# Patient Record
Sex: MALE | Race: WHITE | NOT HISPANIC OR LATINO | URBAN - METROPOLITAN AREA
[De-identification: names, ages, dates, MRNs, and addresses within clinical notes are randomized per-mention and may not be internally consistent; named-entity substitution may affect disease eponyms.]

---

## 2017-03-06 ENCOUNTER — EMERGENCY (EMERGENCY)
Facility: HOSPITAL | Age: 67
LOS: 1 days | Discharge: HOME | End: 2017-03-06
Attending: EMERGENCY MEDICINE | Admitting: EMERGENCY MEDICINE
Payer: COMMERCIAL

## 2017-03-06 VITALS
SYSTOLIC BLOOD PRESSURE: 118 MMHG | RESPIRATION RATE: 18 BRPM | DIASTOLIC BLOOD PRESSURE: 64 MMHG | OXYGEN SATURATION: 99 % | HEART RATE: 60 BPM | TEMPERATURE: 99 F

## 2017-03-06 DIAGNOSIS — Z98.89 OTHER SPECIFIED POSTPROCEDURAL STATES: Chronic | ICD-10-CM

## 2017-03-06 PROCEDURE — 99283 EMERGENCY DEPT VISIT LOW MDM: CPT | Mod: GC

## 2017-03-06 PROCEDURE — 71020: CPT | Mod: 26

## 2017-03-06 RX ORDER — ACETAMINOPHEN 500 MG
650 TABLET ORAL ONCE
Qty: 0 | Refills: 0 | Status: COMPLETED | OUTPATIENT
Start: 2017-03-06 | End: 2017-03-06

## 2017-03-06 RX ADMIN — Medication 650 MILLIGRAM(S): at 21:38

## 2017-03-06 NOTE — ED PROVIDER NOTE - CARE PLAN
Principal Discharge DX:	Viral syndrome  Instructions for follow-up, activity and diet:	Follow up with your PMD within 48-72 hours. Clinic number: [phone].  Find a doctor: [phone].   Rest.  No sports or strenuous activities. Drink plenty of fluids. Take tylenol 650mg every 6 hours for pain or temp greater than 100.   May add ibuprofen 600 mg every 8 hours if you still have fevers or pain. take with food.  Return to the Emergency Department immediately for any worsening or continued fever, chills, weakness, nausea, vomiting, diarrhea, abdominal pain, headaches, neck stiffness, chest pain or shortness of breath. Principal Discharge DX:	Viral syndrome  Instructions for follow-up, activity and diet:	Follow up with your PMD within 48-72 hours. Clinic number: [phone].  Find a doctor: [phone].   Rest.  No sports or strenuous activities. Drink plenty of fluids. Take tylenol 650mg every 6 hours for pain or temp greater than 100.   May add ibuprofen 600 mg every 8 hours if you still have fevers or pain. take with food.  Return to the Emergency Department immediately for any worsening or continued fever, chills, weakness, nausea, vomiting, diarrhea, abdominal pain, headaches, neck stiffness, chest pain or shortness of breath. Principal Discharge DX:	Viral syndrome  Instructions for follow-up, activity and diet:	Follow up with your PMD within 48-72 hours. Clinic number: [phone].  Find a doctor: [phone].   Rest.  No sports or strenuous activities. Drink plenty of fluids. Take tylenol 650mg every 6 hours for pain or temp greater than 100.   May add ibuprofen 600 mg every 8 hours if you still have fevers or pain. take with food.  Return to the Emergency Department immediately for any worsening or continued fever, chills, weakness, nausea, vomiting, diarrhea, abdominal pain, headaches, neck stiffness, chest pain or shortness of breath.

## 2017-03-06 NOTE — ED PROVIDER NOTE - OBJECTIVE STATEMENT
65 y/o 67 y/o male here with c/o prod cough, muscle aches, fever x3 days.  Per pt, he developed these sx gradually and does not feel weak, but wanted to make sure he did not have pneumonia.

## 2017-03-06 NOTE — ED PROVIDER NOTE - PMH
Anxiety disorder    BPH (benign prostatic hypertrophy)    Cecal volvulus    Panic disorder    Sleep apnea, unspecified type

## 2017-03-06 NOTE — ED PROVIDER NOTE - PLAN OF CARE
Follow up with your PMD within 48-72 hours. Clinic number: [phone].  Find a doctor: [phone].   Rest.  No sports or strenuous activities. Drink plenty of fluids. Take tylenol 650mg every 6 hours for pain or temp greater than 100.   May add ibuprofen 600 mg every 8 hours if you still have fevers or pain. take with food.  Return to the Emergency Department immediately for any worsening or continued fever, chills, weakness, nausea, vomiting, diarrhea, abdominal pain, headaches, neck stiffness, chest pain or shortness of breath.

## 2017-03-06 NOTE — ED ADULT NURSE NOTE - OBJECTIVE STATEMENT
pt received spot intake 13, alert and orientedx3. pt c.o cough, fever, body aches x3 days. no cough noted at this time. denies chest pain nausea dizziness. repsairtions even unlabored. medicated per mar. pt to be brought to results waiting for pain reassessment

## 2017-03-06 NOTE — ED PROVIDER NOTE - MEDICAL DECISION MAKING DETAILS
67 y/o male cough, fever, muscle aches x3 days with clear lung exam.  Sx c/w viral syndrome vs flu vs pneumonia.  CXR to eval for consolidation.  Otherwise well appearing with stable vs.  If no consolidation, d/c home, advise acetaminophen, fluids, close pcp f/u, to return if worse.

## 2017-03-28 ENCOUNTER — TRANSCRIPTION ENCOUNTER (OUTPATIENT)
Age: 67
End: 2017-03-28

## 2017-03-28 ENCOUNTER — EMERGENCY (EMERGENCY)
Facility: HOSPITAL | Age: 67
LOS: 1 days | Discharge: HOME | End: 2017-03-28
Attending: EMERGENCY MEDICINE | Admitting: EMERGENCY MEDICINE
Payer: COMMERCIAL

## 2017-03-28 VITALS — OXYGEN SATURATION: 100 % | TEMPERATURE: 99 F | HEART RATE: 72 BPM

## 2017-03-28 DIAGNOSIS — Z98.89 OTHER SPECIFIED POSTPROCEDURAL STATES: Chronic | ICD-10-CM

## 2017-03-28 PROCEDURE — 99284 EMERGENCY DEPT VISIT MOD MDM: CPT

## 2017-03-28 PROCEDURE — 93971 EXTREMITY STUDY: CPT | Mod: 26,LT

## 2017-03-28 NOTE — ED PROVIDER NOTE - CONSTITUTIONAL, MLM
normal... Well appearing, well nourished, awake, alert, oriented to person, place, time/situation and in no apparent distress. Vital signs stable.

## 2017-03-28 NOTE — ED PROVIDER NOTE - OBJECTIVE STATEMENT
65 y/o male, with PMHx of varicose vein to left leg presents to the ED c/o left leg swelling and change in color x 1 day with increase in pain from baseline. Pt reports visiting the urgent care and referring the pt to the ED. Also notes recent sickness, 3 days ago. Denies numbness, tingling, weakness, fever, chills, CP, SOB, abd pain, and any other complaints.

## 2017-03-28 NOTE — ED PROVIDER NOTE - CARE PLAN
Principal Discharge DX:	Hematoma  Instructions for follow-up, activity and diet:	Follow up with your primay care provider in 24-48 hours. they may recommend a repeat ultrasound in 1 week  [facility] a doctor at [phone] to find an provider who takes your insurance   Any worsening of symptoms or new concerning symptoms return to the ED Principal Discharge DX:	Hematoma  Instructions for follow-up, activity and diet:	Follow up with your primay care provider in 24-48 hours. they may recommend a repeat ultrasound in 1 week  [facility] at [phone] to find an provider who takes your insurance   Any worsening of symptoms or new concerning symptoms return to the ED

## 2017-03-28 NOTE — ED PROVIDER NOTE - MEDICAL DECISION MAKING DETAILS
Will get US r/o DVT. Per exam, brisk cap refill and pounding DP pulses. Not consistent with arterial insufficiency. Likely blood from disrupted varicosity.

## 2017-03-28 NOTE — ED ADULT TRIAGE NOTE - CHIEF COMPLAINT QUOTE
Pt with c/o of left leg pain for one day pt was seen in urgent care for Doppler. Pt noted with varicose veins and leg discoloration.Pt denies chest pain or sob

## 2017-03-28 NOTE — ED PROVIDER NOTE - NS ED MD SCRIBE ATTENDING SCRIBE SECTIONS
HIV/PAST MEDICAL/SURGICAL/SOCIAL HISTORY/DISPOSITION/HISTORY OF PRESENT ILLNESS/VITAL SIGNS( Pullset)/PHYSICAL EXAM/REVIEW OF SYSTEMS

## 2017-03-28 NOTE — ED PROVIDER NOTE - PLAN OF CARE
Follow up with your primay care provider in 24-48 hours. they may recommend a repeat ultrasound in 1 week  Call [facility] Find a doctor at [phone] to find an provider who takes your insurance   Any worsening of symptoms or new concerning symptoms return to the ED

## 2017-04-18 ENCOUNTER — EMERGENCY (EMERGENCY)
Facility: HOSPITAL | Age: 67
LOS: 1 days | Discharge: LEFT BEFORE DISCHARGE | End: 2017-04-18
Admitting: EMERGENCY MEDICINE

## 2017-04-18 VITALS
HEART RATE: 66 BPM | RESPIRATION RATE: 16 BRPM | TEMPERATURE: 97 F | OXYGEN SATURATION: 98 % | SYSTOLIC BLOOD PRESSURE: 124 MMHG | DIASTOLIC BLOOD PRESSURE: 80 MMHG

## 2017-04-18 DIAGNOSIS — Z98.89 OTHER SPECIFIED POSTPROCEDURAL STATES: Chronic | ICD-10-CM

## 2017-04-18 NOTE — ED ADULT TRIAGE NOTE - CHIEF COMPLAINT QUOTE
non radiating right sided chest pain, continuous, dull, increasing with movement  3/10  denies fever sob cough but states that three weeks ago he had flu with severe coughing and he suspects he might have ppulled a muscle

## 2017-04-20 ENCOUNTER — EMERGENCY (EMERGENCY)
Facility: HOSPITAL | Age: 67
LOS: 1 days | Discharge: HOME | End: 2017-04-20
Attending: EMERGENCY MEDICINE | Admitting: EMERGENCY MEDICINE
Payer: COMMERCIAL

## 2017-04-20 VITALS
TEMPERATURE: 99 F | DIASTOLIC BLOOD PRESSURE: 59 MMHG | HEART RATE: 52 BPM | RESPIRATION RATE: 20 BRPM | SYSTOLIC BLOOD PRESSURE: 105 MMHG | OXYGEN SATURATION: 97 %

## 2017-04-20 VITALS
SYSTOLIC BLOOD PRESSURE: 124 MMHG | RESPIRATION RATE: 16 BRPM | OXYGEN SATURATION: 99 % | HEART RATE: 42 BPM | DIASTOLIC BLOOD PRESSURE: 69 MMHG

## 2017-04-20 DIAGNOSIS — Z98.89 OTHER SPECIFIED POSTPROCEDURAL STATES: Chronic | ICD-10-CM

## 2017-04-20 PROCEDURE — 99285 EMERGENCY DEPT VISIT HI MDM: CPT | Mod: 25

## 2017-04-20 PROCEDURE — 93010 ELECTROCARDIOGRAM REPORT: CPT

## 2017-04-20 PROCEDURE — 71020: CPT | Mod: 26

## 2017-04-20 NOTE — ED PROVIDER NOTE - ATTENDING CONTRIBUTION TO CARE
Attending note: I have discussed with the resident their history, physical assessment, overall patient evaluation, and interview. I agree with the conclusions and proposed medical plan for the patient in the ED. I have personally interviewed, evaluated and examined the patient.

## 2017-04-20 NOTE — ED PROVIDER NOTE - CHPI ED SYMPTOMS NEG
no dizziness/no nausea/no vomiting/no fever/no numbness/no chills/no weakness/no tingling/no decreased eating/drinking

## 2017-04-20 NOTE — ED PROVIDER NOTE - PHYSICAL EXAMINATION
+mild b/l LE edema that pt states is chronic 2/2 varicose veins for which he follows with a vascular surgeon and PMD  +reproducible chest wall tenderness on R side of chest along intercostal muscles where pt reports pain +mild b/l LE edema that pt states is chronic 2/2 varicose veins for which he follows with a vascular surgeon and PMD  +reproducible chest wall tenderness on R side of chest along intercostal muscles where pt reports pain  Attending Note: 66 y/o male presents to the ED c/o non-radiating right sided c/p x weeks. PMH panic attacks. Not felling well x 3 weeks ago with flu. Improved but had significant coughing during his flu and first developed this pain during that time with coughing. Flu symptoms now resolved, x 4-5 days worsening non-radiating, nonexertional, non-pleuritic right sided c/p. No fever/chills, SOB, LAWRENCE, diaphoresis, syncope, trauma. Increases with movement. No: N/V/D, abdominal pain, trauma. Did not take any Rx for pain. No h/o cardiac disease.

## 2017-04-20 NOTE — ED PROVIDER NOTE - MEDICAL DECISION MAKING DETAILS
67M with chest discomfort x 2 weeks in setting of recent illness with coughing. Low suspcision for ACS given lack of SOB or exertional symptoms, clinical context of recent illness. Suspect musculoskeletal etiology. Will obtain EKG for evaluation for pericarditis (low suspicion), CXR to eval for PTX, reassess. Pt offered analgesia but declines.

## 2017-04-20 NOTE — ED PROVIDER NOTE - FAMILY HISTORY
Grandparent  Still living? Unknown  Family history of diabetes mellitus, Age at diagnosis: Age Unknown  Family history of cancer, Age at diagnosis: Age Unknown

## 2017-04-20 NOTE — ED ADULT NURSE NOTE - CHPI ED SYMPTOMS NEG
no back pain/no fever/no nausea/no syncope/no diaphoresis/no dizziness/no vomiting/no cough/no shortness of breath/no chills

## 2017-04-20 NOTE — ED ADULT NURSE NOTE - OBJECTIVE STATEMENT
Pt presents to the ED with c/o right anterior chest pain. Pt states that he had the flu 3 weeks ago and was "coughing a lot" during that time. Pt states that the chest pain began 3 weeks ago while he had the flu and was actively coughing. He then states that chest pain subsided and came back 4 days ago. Pt went to orthopedic walk in on tuesday, where they then referred him to the ED. Pt states that chest pain is present during movement of torso and occurs during palpation of right chest wall. Pt denies any chest pain during exercise. Pt presents with a low HR but states that this is baseline. Pt denies any SOB, fever, chills, abd pain, radiating pain. Pt has hx of panic and anxiety disorders. Pt pain is 1/10 when resting and 4/10 when moving. Pt refuses pain medication because he "does not believe in medications", and refuses lab work. Pt sinus bhavin on cardiac monitor. Will continue to monitor.

## 2017-04-20 NOTE — ED PROVIDER NOTE - OBJECTIVE STATEMENT
67M PMH panic attacks p/w several weeks of nagging right sided chest pain. Pain is nonradiating. Reports ill 3 weeks ago with flu, has since improved but had significant coughing during his flu and first developed this pain during that time a/w coughing. Flu symptoms now resolved but over past 4-5 days has had some worsening of right sided chest pain. No fever/chills. No SOB or LAWRENCE. Pain is nonradiating and nonexertional. Not pleuritic. Worse with movement. No NVD, abd pain. Did not take any medications for pain. No hx of cardiac disease, has good f/u.

## 2017-10-13 ENCOUNTER — APPOINTMENT (OUTPATIENT)
Dept: SPEECH THERAPY | Facility: CLINIC | Age: 67
End: 2017-10-13

## 2017-10-31 ENCOUNTER — OUTPATIENT (OUTPATIENT)
Dept: OUTPATIENT SERVICES | Facility: HOSPITAL | Age: 67
LOS: 1 days | Discharge: HOME | End: 2017-10-31

## 2017-10-31 ENCOUNTER — APPOINTMENT (OUTPATIENT)
Dept: SPEECH THERAPY | Facility: CLINIC | Age: 67
End: 2017-10-31

## 2017-10-31 DIAGNOSIS — Z98.89 OTHER SPECIFIED POSTPROCEDURAL STATES: Chronic | ICD-10-CM

## 2017-11-09 ENCOUNTER — APPOINTMENT (OUTPATIENT)
Dept: PHARMACY | Facility: CLINIC | Age: 67
End: 2017-11-09

## 2017-11-21 ENCOUNTER — APPOINTMENT (OUTPATIENT)
Dept: OTOLARYNGOLOGY | Facility: CLINIC | Age: 67
End: 2017-11-21

## 2017-12-06 DIAGNOSIS — H90.3 SENSORINEURAL HEARING LOSS, BILATERAL: ICD-10-CM

## 2017-12-08 ENCOUNTER — APPOINTMENT (OUTPATIENT)
Dept: OTOLARYNGOLOGY | Facility: CLINIC | Age: 67
End: 2017-12-08

## 2017-12-14 ENCOUNTER — APPOINTMENT (OUTPATIENT)
Dept: PHARMACY | Facility: CLINIC | Age: 67
End: 2017-12-14
Payer: SELF-PAY

## 2017-12-14 ENCOUNTER — APPOINTMENT (OUTPATIENT)
Dept: OTOLARYNGOLOGY | Facility: CLINIC | Age: 67
End: 2017-12-14
Payer: COMMERCIAL

## 2017-12-14 VITALS
BODY MASS INDEX: 28.44 KG/M2 | WEIGHT: 210 LBS | HEART RATE: 62 BPM | HEIGHT: 72 IN | SYSTOLIC BLOOD PRESSURE: 145 MMHG | DIASTOLIC BLOOD PRESSURE: 85 MMHG

## 2017-12-14 DIAGNOSIS — G47.33 OBSTRUCTIVE SLEEP APNEA (ADULT) (PEDIATRIC): ICD-10-CM

## 2017-12-14 DIAGNOSIS — H93.13 TINNITUS, BILATERAL: ICD-10-CM

## 2017-12-14 DIAGNOSIS — Z87.438 PERSONAL HISTORY OF OTHER DISEASES OF MALE GENITAL ORGANS: ICD-10-CM

## 2017-12-14 DIAGNOSIS — Z86.69 PERSONAL HISTORY OF OTHER DISEASES OF THE NERVOUS SYSTEM AND SENSE ORGANS: ICD-10-CM

## 2017-12-14 PROCEDURE — 99203 OFFICE O/P NEW LOW 30 MIN: CPT

## 2017-12-14 PROCEDURE — V5010 ASSESSMENT FOR HEARING AID: CPT | Mod: NC

## 2017-12-14 RX ORDER — MODAFINIL 100 MG/1
100 TABLET ORAL
Qty: 30 | Refills: 0 | Status: DISCONTINUED | COMMUNITY
Start: 2017-11-28

## 2017-12-18 ENCOUNTER — APPOINTMENT (OUTPATIENT)
Dept: PHARMACY | Facility: CLINIC | Age: 67
End: 2017-12-18

## 2017-12-29 ENCOUNTER — APPOINTMENT (OUTPATIENT)
Dept: OTOLARYNGOLOGY | Facility: CLINIC | Age: 67
End: 2017-12-29

## 2018-01-17 ENCOUNTER — EMERGENCY (EMERGENCY)
Facility: HOSPITAL | Age: 68
LOS: 1 days | Discharge: HOME | End: 2018-01-17
Attending: EMERGENCY MEDICINE | Admitting: EMERGENCY MEDICINE
Payer: COMMERCIAL

## 2018-01-17 VITALS
HEART RATE: 68 BPM | OXYGEN SATURATION: 100 % | DIASTOLIC BLOOD PRESSURE: 87 MMHG | TEMPERATURE: 98 F | RESPIRATION RATE: 16 BRPM | SYSTOLIC BLOOD PRESSURE: 141 MMHG

## 2018-01-17 DIAGNOSIS — Z98.89 OTHER SPECIFIED POSTPROCEDURAL STATES: Chronic | ICD-10-CM

## 2018-01-17 PROCEDURE — 73620 X-RAY EXAM OF FOOT: CPT | Mod: 26,LT

## 2018-01-17 PROCEDURE — 99283 EMERGENCY DEPT VISIT LOW MDM: CPT | Mod: 25

## 2018-01-17 RX ORDER — ACETAMINOPHEN 500 MG
650 TABLET ORAL ONCE
Qty: 0 | Refills: 0 | Status: COMPLETED | OUTPATIENT
Start: 2018-01-17 | End: 2018-01-17

## 2018-01-17 NOTE — ED ADULT TRIAGE NOTE - CHIEF COMPLAINT QUOTE
Left top of foot pain S/P dropping bottle on foot, pt foot noted to be swollen and red, as per patient "vascular issues", pt ambulatory without difficulty, just wants to make sure foot is "not broken"

## 2018-01-17 NOTE — ED PROVIDER NOTE - MEDICAL DECISION MAKING DETAILS
67M PMH PVD p/w L foot pain after dropping bottle of water on foot, no other systemic complaints. Vitals wnl, exam as above.  Clinically not infectious. Likely just muscle bruising. Low suspicion for fx.  Tylenol prn, xr, likely outpt pmd f/u.

## 2018-01-17 NOTE — ED PROVIDER NOTE - PHYSICAL EXAMINATION
no LE edema, normal equal distal pulses.   b/l LE hyperpigmentation, scattered erythema. non-pitting edema - per pt chronic and unchanged. no warmth. no skin breaks overlying area of pain. Minimal ttp L foot lateral/dorsal aspect. FROm.

## 2018-01-17 NOTE — ED PROVIDER NOTE - OBJECTIVE STATEMENT
67M PMH PVD p/w L foot pain. Dropped bottle of water on foot ~18 hrs ago, able to ambulate but pain wasn't improving so came to ED to make sure it wasn't broken. Hasn't taken anything for pain. Denies any other injuries. Denies f/c, weakness/numbness.

## 2018-01-18 ENCOUNTER — APPOINTMENT (OUTPATIENT)
Dept: PHARMACY | Facility: CLINIC | Age: 68
End: 2018-01-18

## 2018-01-22 ENCOUNTER — APPOINTMENT (OUTPATIENT)
Dept: PHARMACY | Facility: CLINIC | Age: 68
End: 2018-01-22

## 2018-01-30 ENCOUNTER — APPOINTMENT (OUTPATIENT)
Dept: PHARMACY | Facility: CLINIC | Age: 68
End: 2018-01-30

## 2018-01-30 ENCOUNTER — APPOINTMENT (OUTPATIENT)
Dept: OTOLARYNGOLOGY | Facility: CLINIC | Age: 68
End: 2018-01-30

## 2018-02-02 ENCOUNTER — APPOINTMENT (OUTPATIENT)
Dept: PHARMACY | Facility: CLINIC | Age: 68
End: 2018-02-02
Payer: SELF-PAY

## 2018-02-02 PROCEDURE — V5299A: CUSTOM | Mod: NC

## 2018-02-12 ENCOUNTER — APPOINTMENT (OUTPATIENT)
Dept: PHARMACY | Facility: CLINIC | Age: 68
End: 2018-02-12

## 2018-02-27 ENCOUNTER — APPOINTMENT (OUTPATIENT)
Dept: PHARMACY | Facility: CLINIC | Age: 68
End: 2018-02-27

## 2018-03-05 ENCOUNTER — APPOINTMENT (OUTPATIENT)
Dept: PHARMACY | Facility: CLINIC | Age: 68
End: 2018-03-05
Payer: SELF-PAY

## 2018-03-05 PROCEDURE — V5299A: CUSTOM | Mod: NC

## 2018-03-08 ENCOUNTER — APPOINTMENT (OUTPATIENT)
Dept: PHARMACY | Facility: CLINIC | Age: 68
End: 2018-03-08

## 2018-09-04 ENCOUNTER — EMERGENCY (EMERGENCY)
Facility: HOSPITAL | Age: 68
LOS: 1 days | Discharge: HOME | End: 2018-09-04
Attending: EMERGENCY MEDICINE | Admitting: EMERGENCY MEDICINE
Payer: COMMERCIAL

## 2018-09-04 VITALS
HEART RATE: 86 BPM | DIASTOLIC BLOOD PRESSURE: 66 MMHG | TEMPERATURE: 99 F | OXYGEN SATURATION: 96 % | SYSTOLIC BLOOD PRESSURE: 110 MMHG | RESPIRATION RATE: 16 BRPM

## 2018-09-04 DIAGNOSIS — Z98.89 OTHER SPECIFIED POSTPROCEDURAL STATES: Chronic | ICD-10-CM

## 2018-09-04 LAB
ALBUMIN SERPL ELPH-MCNC: 3.8 G/DL — SIGNIFICANT CHANGE UP (ref 3.3–5)
ALP SERPL-CCNC: 78 U/L — SIGNIFICANT CHANGE UP (ref 40–120)
ALT FLD-CCNC: 11 U/L — SIGNIFICANT CHANGE UP (ref 4–41)
APPEARANCE UR: SIGNIFICANT CHANGE UP
AST SERPL-CCNC: 16 U/L — SIGNIFICANT CHANGE UP (ref 4–40)
BACTERIA # UR AUTO: NEGATIVE — SIGNIFICANT CHANGE UP
BASE EXCESS BLDV CALC-SCNC: 1.2 MMOL/L — SIGNIFICANT CHANGE UP
BASOPHILS # BLD AUTO: 0.05 K/UL — SIGNIFICANT CHANGE UP (ref 0–0.2)
BASOPHILS NFR BLD AUTO: 0.7 % — SIGNIFICANT CHANGE UP (ref 0–2)
BILIRUB SERPL-MCNC: 0.8 MG/DL — SIGNIFICANT CHANGE UP (ref 0.2–1.2)
BILIRUB UR-MCNC: NEGATIVE — SIGNIFICANT CHANGE UP
BLOOD GAS VENOUS - CREATININE: 0.85 MG/DL — SIGNIFICANT CHANGE UP (ref 0.5–1.3)
BLOOD UR QL VISUAL: NEGATIVE — SIGNIFICANT CHANGE UP
BUN SERPL-MCNC: 18 MG/DL — SIGNIFICANT CHANGE UP (ref 7–23)
CALCIUM SERPL-MCNC: 8.3 MG/DL — LOW (ref 8.4–10.5)
CHLORIDE BLDV-SCNC: 108 MMOL/L — SIGNIFICANT CHANGE UP (ref 96–108)
CHLORIDE SERPL-SCNC: 105 MMOL/L — SIGNIFICANT CHANGE UP (ref 98–107)
CO2 SERPL-SCNC: 22 MMOL/L — SIGNIFICANT CHANGE UP (ref 22–31)
COLOR SPEC: YELLOW — SIGNIFICANT CHANGE UP
CREAT SERPL-MCNC: 0.88 MG/DL — SIGNIFICANT CHANGE UP (ref 0.5–1.3)
EOSINOPHIL # BLD AUTO: 0.12 K/UL — SIGNIFICANT CHANGE UP (ref 0–0.5)
EOSINOPHIL NFR BLD AUTO: 1.6 % — SIGNIFICANT CHANGE UP (ref 0–6)
GAS PNL BLDV: 136 MMOL/L — SIGNIFICANT CHANGE UP (ref 136–146)
GLUCOSE BLDV-MCNC: 97 — SIGNIFICANT CHANGE UP (ref 70–99)
GLUCOSE SERPL-MCNC: 96 MG/DL — SIGNIFICANT CHANGE UP (ref 70–99)
GLUCOSE UR-MCNC: NEGATIVE — SIGNIFICANT CHANGE UP
HCO3 BLDV-SCNC: 24 MMOL/L — SIGNIFICANT CHANGE UP (ref 20–27)
HCT VFR BLD CALC: 39.7 % — SIGNIFICANT CHANGE UP (ref 39–50)
HCT VFR BLDV CALC: 40.6 % — SIGNIFICANT CHANGE UP (ref 39–51)
HGB BLD-MCNC: 12.9 G/DL — LOW (ref 13–17)
HGB BLDV-MCNC: 13.2 G/DL — SIGNIFICANT CHANGE UP (ref 13–17)
IMM GRANULOCYTES # BLD AUTO: 0.02 # — SIGNIFICANT CHANGE UP
IMM GRANULOCYTES NFR BLD AUTO: 0.3 % — SIGNIFICANT CHANGE UP (ref 0–1.5)
KETONES UR-MCNC: SIGNIFICANT CHANGE UP
LACTATE BLDV-MCNC: 1.4 MMOL/L — SIGNIFICANT CHANGE UP (ref 0.5–2)
LEUKOCYTE ESTERASE UR-ACNC: NEGATIVE — SIGNIFICANT CHANGE UP
LYMPHOCYTES # BLD AUTO: 0.65 K/UL — LOW (ref 1–3.3)
LYMPHOCYTES # BLD AUTO: 8.5 % — LOW (ref 13–44)
MCHC RBC-ENTMCNC: 31.3 PG — SIGNIFICANT CHANGE UP (ref 27–34)
MCHC RBC-ENTMCNC: 32.5 % — SIGNIFICANT CHANGE UP (ref 32–36)
MCV RBC AUTO: 96.4 FL — SIGNIFICANT CHANGE UP (ref 80–100)
MONOCYTES # BLD AUTO: 0.77 K/UL — SIGNIFICANT CHANGE UP (ref 0–0.9)
MONOCYTES NFR BLD AUTO: 10.1 % — SIGNIFICANT CHANGE UP (ref 2–14)
NEUTROPHILS # BLD AUTO: 6.02 K/UL — SIGNIFICANT CHANGE UP (ref 1.8–7.4)
NEUTROPHILS NFR BLD AUTO: 78.8 % — HIGH (ref 43–77)
NITRITE UR-MCNC: NEGATIVE — SIGNIFICANT CHANGE UP
NRBC # FLD: 0 — SIGNIFICANT CHANGE UP
PCO2 BLDV: 46 MMHG — SIGNIFICANT CHANGE UP (ref 41–51)
PH BLDV: 7.37 PH — SIGNIFICANT CHANGE UP (ref 7.32–7.43)
PH UR: 6 — SIGNIFICANT CHANGE UP (ref 5–8)
PLATELET # BLD AUTO: 123 K/UL — LOW (ref 150–400)
PMV BLD: 12.2 FL — SIGNIFICANT CHANGE UP (ref 7–13)
PO2 BLDV: 32 MMHG — LOW (ref 35–40)
POTASSIUM BLDV-SCNC: 3.6 MMOL/L — SIGNIFICANT CHANGE UP (ref 3.4–4.5)
POTASSIUM SERPL-MCNC: 3.7 MMOL/L — SIGNIFICANT CHANGE UP (ref 3.5–5.3)
POTASSIUM SERPL-SCNC: 3.7 MMOL/L — SIGNIFICANT CHANGE UP (ref 3.5–5.3)
PROT SERPL-MCNC: 5.9 G/DL — LOW (ref 6–8.3)
PROT UR-MCNC: SIGNIFICANT CHANGE UP
RBC # BLD: 4.12 M/UL — LOW (ref 4.2–5.8)
RBC # FLD: 13.3 % — SIGNIFICANT CHANGE UP (ref 10.3–14.5)
RBC CASTS # UR COMP ASSIST: HIGH (ref 0–?)
SAO2 % BLDV: 62.1 % — SIGNIFICANT CHANGE UP (ref 60–85)
SODIUM SERPL-SCNC: 139 MMOL/L — SIGNIFICANT CHANGE UP (ref 135–145)
SP GR SPEC: 1.03 — SIGNIFICANT CHANGE UP (ref 1–1.04)
SQUAMOUS # UR AUTO: SIGNIFICANT CHANGE UP
URATE CRY FLD QL MICRO: SIGNIFICANT CHANGE UP (ref 0–0)
UROBILINOGEN FLD QL: SIGNIFICANT CHANGE UP
WBC # BLD: 7.63 K/UL — SIGNIFICANT CHANGE UP (ref 3.8–10.5)
WBC # FLD AUTO: 7.63 K/UL — SIGNIFICANT CHANGE UP (ref 3.8–10.5)
WBC UR QL: SIGNIFICANT CHANGE UP (ref 0–?)

## 2018-09-04 PROCEDURE — 71046 X-RAY EXAM CHEST 2 VIEWS: CPT | Mod: 26

## 2018-09-04 PROCEDURE — 99283 EMERGENCY DEPT VISIT LOW MDM: CPT

## 2018-09-04 RX ORDER — SODIUM CHLORIDE 9 MG/ML
1000 INJECTION INTRAMUSCULAR; INTRAVENOUS; SUBCUTANEOUS ONCE
Qty: 0 | Refills: 0 | Status: COMPLETED | OUTPATIENT
Start: 2018-09-04 | End: 2018-09-04

## 2018-09-04 RX ORDER — AZITHROMYCIN 500 MG/1
1 TABLET, FILM COATED ORAL
Qty: 3 | Refills: 0 | OUTPATIENT
Start: 2018-09-04 | End: 2018-09-06

## 2018-09-04 RX ADMIN — SODIUM CHLORIDE 1000 MILLILITER(S): 9 INJECTION INTRAMUSCULAR; INTRAVENOUS; SUBCUTANEOUS at 18:57

## 2018-09-04 NOTE — ED PROVIDER NOTE - MEDICAL DECISION MAKING DETAILS
plan to hydrate as pt appears dehyrated due to warm conditions, check labs, r/o pna, likely discharge. Pt leon snto want social support and states that he is working on getting AC from the [facility]

## 2018-09-04 NOTE — ED PROVIDER NOTE - OBJECTIVE STATEMENT
67 y/o M with h/o bph, cecal volvulus repair p/w diffuse boydaches for 1 day with cough and had green sputum today. Pt has been living in hot conditions and had no ac and owns an apartment. No recent travel or sick contact. Pt was working too much at his apartment in extreme heat and started feeling weak and tired 67 y/o M with h/o bph, cecal volvulus repair p/w diffuse bodyaches for 1 day with cough and had green sputum today. Pt has been living in hot conditions and had no ac and owns an apartment. No recent travel or sick contact. Pt was working too much at his apartment in extreme heat and started feeling weak and tired

## 2018-09-04 NOTE — ED PROVIDER NOTE - PROGRESS NOTE DETAILS
Dejon CHAVIS- pt reassured of no serious illness, Pt discharged home with advise to avoid extreme heat, stay hydrated

## 2018-09-04 NOTE — ED ADULT TRIAGE NOTE - CHIEF COMPLAINT QUOTE
Pt states that he has had subjective fever, sore throat, cough with yellow sputum since yesterday.  Pt states that he wants to find out if he's contagious. Has not taken any OTC medications for his symptoms PMH: TANGELA, BPH, volvulus, large bowel resection

## 2018-09-05 RX ORDER — AZITHROMYCIN 500 MG/1
1 TABLET, FILM COATED ORAL
Qty: 3 | Refills: 0 | OUTPATIENT
Start: 2018-09-05 | End: 2018-09-07

## 2018-09-05 NOTE — ED POST DISCHARGE NOTE - REASON FOR FOLLOW-UP
Other Patient requested we send Abx to different pharmacy. Sent over Azithromycin  to preferred pharmacy.

## 2018-12-07 ENCOUNTER — EMERGENCY (EMERGENCY)
Facility: HOSPITAL | Age: 68
LOS: 1 days | Discharge: HOME | End: 2018-12-07
Attending: EMERGENCY MEDICINE | Admitting: EMERGENCY MEDICINE
Payer: COMMERCIAL

## 2018-12-07 VITALS
OXYGEN SATURATION: 100 % | HEART RATE: 62 BPM | DIASTOLIC BLOOD PRESSURE: 84 MMHG | SYSTOLIC BLOOD PRESSURE: 161 MMHG | TEMPERATURE: 98 F | RESPIRATION RATE: 18 BRPM

## 2018-12-07 VITALS
RESPIRATION RATE: 16 BRPM | HEART RATE: 66 BPM | SYSTOLIC BLOOD PRESSURE: 160 MMHG | OXYGEN SATURATION: 100 % | DIASTOLIC BLOOD PRESSURE: 92 MMHG

## 2018-12-07 DIAGNOSIS — Z98.89 OTHER SPECIFIED POSTPROCEDURAL STATES: Chronic | ICD-10-CM

## 2018-12-07 PROCEDURE — 29125 APPL SHORT ARM SPLINT STATIC: CPT | Mod: RT

## 2018-12-07 PROCEDURE — 99283 EMERGENCY DEPT VISIT LOW MDM: CPT | Mod: 25

## 2018-12-07 PROCEDURE — 73110 X-RAY EXAM OF WRIST: CPT | Mod: 26,50

## 2018-12-07 PROCEDURE — 73130 X-RAY EXAM OF HAND: CPT | Mod: 26,50

## 2018-12-07 RX ORDER — ACETAMINOPHEN 500 MG
975 TABLET ORAL ONCE
Qty: 0 | Refills: 0 | Status: COMPLETED | OUTPATIENT
Start: 2018-12-07 | End: 2018-12-07

## 2018-12-07 NOTE — ED PROVIDER NOTE - ATTENDING CONTRIBUTION TO CARE
68M [facility] trip and fall outside.  Fell forward, hitting knees, R thumb, wrist, face.  Did not take anything for pain.  PMHX – TANGELA on CPAP, venous insufficiency.  No meds. R thumb ttp, R wrist mild ttp, no deformity.   Normal neuro exam.  Walked since the fall.  Mild nasal bone ttp, no deformity or epistaxis.  Plan xrays or R thumb, L wrist, R thumb spica; follow up with ENT for nasal abrasion and ortho for wrist/thumb injuries - rx pain med.  Td UTD.   VS:  unremarkable except htn    GEN - NAD; well appearing; A+O x3   HEAD - NC/AT     ENT - PEERL, EOMI, mucous membranes  moist , no discharge.  nasal abrasions.  No septal hematoma.  dried blood at nares, no active bleeding.  No facial ttp.     NECK: Neck supple, non-tender without lymphadenopathy, no masses, no JVD  PULM - CTA b/l,  symmetric breath sounds  COR -  normal heart sounds    ABD - , ND, NT, soft,  BACK - no CVA tenderness, nontender spine     EXTREMS - no edema, no deformity, warm and well perfused  R thumb mild ttp no deformity.  L wrist mild diffuse dorsal ttp no deformity.  distal N/V/T intact x 4.    SKIN - no rash or bruising    except as noted.   NEUROLOGIC - alert, CN 2-12 intact, sensation nl, motor no focal deficit.

## 2018-12-07 NOTE — ED PROVIDER NOTE - CARE PLAN
Principal Discharge DX:	Wrist sprain, right, initial encounter  Secondary Diagnosis:	Fall, initial encounter

## 2018-12-07 NOTE — ED ADULT NURSE REASSESSMENT NOTE - NS ED NURSE REASSESS COMMENT FT1
Pt a/o x 3. Pt discharged by Dr. briggs. Pt given written and verbal instructions by MD. Iv removed as per hospital protocol. Pt ambulated to exit

## 2018-12-07 NOTE — ED PROVIDER NOTE - ENMT, MLM
Airway patent, Nasal mucosa clear. No septal hematoma. Mild redness over nose and pain on palpation.

## 2018-12-07 NOTE — ED ADULT NURSE NOTE - CHIEF COMPLAINT QUOTE
Ambulatory s/p trip and fall on the street, patient has injury to nose, R thumb, abrasions to BL knees, and some to both hands. Patient also broke some teeth as a result of the fall, no oral bleeding. Patient denies LOC. Reports minimal gait instability, headache. No active bleeding at this time.

## 2018-12-07 NOTE — ED ADULT NURSE NOTE - NSIMPLEMENTINTERV_GEN_ALL_ED
Implemented All Fall Risk Interventions:  [place] to call system. Call bell, personal items and telephone within reach. Instruct patient to call for assistance. Room bathroom lighting operational. Non-slip footwear when patient is off stretcher. Physically safe environment: no spills, clutter or unnecessary equipment. Stretcher in lowest position, wheels locked, appropriate side rails in place. Provide visual cue, wrist band, yellow gown, etc. Monitor gait and stability. Monitor for mental status changes and reorient to person, place, and time. Review medications for side effects contributing to fall risk. Reinforce activity limits and safety measures with patient and family.

## 2018-12-07 NOTE — ED PROVIDER NOTE - MUSCULOSKELETAL, MLM
Spine appears normal, range of motion is not limited, no muscle or joint tenderness. Full ROM in knees and wrists BL. Mild pain to palpation of BL posterior hand and wrists.

## 2018-12-07 NOTE — ED PROVIDER NOTE - SKIN, MLM
Skin normal color for race, warm, dry and intact. Abrasions over knees BL. Redness over nose. Abrasions over R fingers.

## 2018-12-07 NOTE — ED PROVIDER NOTE - MEDICAL DECISION MAKING DETAILS
69 yo M presenting to [facility] ED s/p mechanical fall on sidewalk at night with BL hand and wrist pain, pain over nose, minimal knee pain. Denies other complaints. EOMI, no neuro deficits, no septal hematomas, no snuffbox tenderness. Will obtain hand and wrist xrays BL and treat pain. Reassess.

## 2018-12-07 NOTE — ED PROVIDER NOTE - NSFOLLOWUPINSTRUCTIONS_ED_ALL_ED_FT
Please keep your splint in place until you follow up with your primary doctor. Please keep the splint dry. Please follow up with a primary care provider as soon as possible. If you do not have a primary care doctor, you can make an appointment with one at the following location.   [facility] [place]  Phone: [phone]  Fax: [phone]  Address: [address] [place] [address], [facility] [address], [place] [address]    Additionally, you may use the following web address to find a [facility] physician close to you: [email or web address]     You may take 1000mg of Tylenol every 6 hours for baseline pain control with respect to the warnings on the label. You may take 600mg of ibuprofen (example: motrin or advil) every 6 hours for baseline pain control as indicated with respect to the warnings on the label. This is an over the counter medication. Please return to the emergency department if you experience worsening symptoms, or if you develop headache, neck stiffness, fever/chills, changes in vision, chest pain, shortness of breath, difficulty breathing, palpitations, lightheadedness, weakness, dizziness, numbness, tingling, abdominal pain, nausea, vomiting, diarrhea, changes in your urine, blood in the urine, painful urination, syncope (passing out), or for any other concerns.

## 2018-12-07 NOTE — ED PROVIDER NOTE - NEUROLOGICAL, MLM
Alert and oriented, no focal deficits, no motor or sensory deficits. 5/5 strength all extremities. Cranial nerves intact.

## 2018-12-07 NOTE — ED ADULT NURSE NOTE - OBJECTIVE STATEMENT
Pt ambulated to room 27 a/o x 3 s/p a fall. pt c/o left wrist, right thumb, nose pain. Pt states he was walking and tripped on a crack in the sidewalk. Pt denies any LOC and remembers what happened. Pt states he broke his fall with his hand but hit his nose on the floor. Pt denies being on any blood thinners. Pt noted to have abrasions to both hands and nose with no active bleeding noted. Respirations even and unlabored. Lung sounds clear with equal chest rise bilaterally. ABD is soft, non tender, non distended with normal active bowel sounds No complaints of chest pain, headache, nausea, dizziness, vomiting  SOB, fever, chills verbalized.

## 2018-12-07 NOTE — ED PROVIDER NOTE - OBJECTIVE STATEMENT
67 yo M w/ PMH of TANGELA on CPAP, anxiety, cecal volvulus s/p resection, presenting to [facility] ED d/t mechanical fall while walking from car to house on sidewalk, landing on knees and wrists BL, as well as face. Endorses pain over nose and posterior hands/wrists BL. Minimal pain over knees. Denies any lost teeth; endorses mild HA. Denies other complaints. Denies neck stiffness, fever/chills, vision change, chest pain, shortness of breath, difficulty breathing, palpitations, lightheadedness, weakness, dizziness, numbness, tingling, abdominal pain, nausea, vomiting, diarrhea, dysuria, hematuria, syncope.     PMD: Dr. Rachell Beard  Vascular Surgery: [facility]  Pharmacy: [place], [place]

## 2018-12-10 ENCOUNTER — APPOINTMENT (OUTPATIENT)
Dept: PHARMACY | Facility: CLINIC | Age: 68
End: 2018-12-10

## 2019-01-25 ENCOUNTER — APPOINTMENT (OUTPATIENT)
Dept: PHARMACY | Facility: CLINIC | Age: 69
End: 2019-01-25
Payer: SELF-PAY

## 2019-01-25 PROCEDURE — V5299A: CUSTOM | Mod: NC

## 2019-02-07 ENCOUNTER — APPOINTMENT (OUTPATIENT)
Dept: SPEECH THERAPY | Facility: CLINIC | Age: 69
End: 2019-02-07

## 2019-02-07 ENCOUNTER — APPOINTMENT (OUTPATIENT)
Dept: PHARMACY | Facility: CLINIC | Age: 69
End: 2019-02-07

## 2019-12-31 ENCOUNTER — APPOINTMENT (OUTPATIENT)
Dept: SPEECH THERAPY | Facility: CLINIC | Age: 69
End: 2019-12-31

## 2019-12-31 ENCOUNTER — OUTPATIENT (OUTPATIENT)
Dept: OUTPATIENT SERVICES | Facility: HOSPITAL | Age: 69
LOS: 1 days | Discharge: HOME | End: 2019-12-31

## 2019-12-31 DIAGNOSIS — Z98.89 OTHER SPECIFIED POSTPROCEDURAL STATES: Chronic | ICD-10-CM

## 2020-01-07 ENCOUNTER — APPOINTMENT (OUTPATIENT)
Dept: PHARMACY | Facility: CLINIC | Age: 70
End: 2020-01-07
Payer: SELF-PAY

## 2020-01-07 PROCEDURE — V5299A: CUSTOM | Mod: NC

## 2020-01-14 ENCOUNTER — APPOINTMENT (OUTPATIENT)
Dept: PHARMACY | Facility: CLINIC | Age: 70
End: 2020-01-14
Payer: SELF-PAY

## 2020-01-14 DIAGNOSIS — H90.3 SENSORINEURAL HEARING LOSS, BILATERAL: ICD-10-CM

## 2020-01-14 PROCEDURE — V5299A: CUSTOM | Mod: NC

## 2020-02-06 ENCOUNTER — APPOINTMENT (OUTPATIENT)
Dept: UROLOGY | Facility: CLINIC | Age: 70
End: 2020-02-06
Payer: COMMERCIAL

## 2020-02-06 DIAGNOSIS — K61.0 ANAL ABSCESS: ICD-10-CM

## 2020-02-06 DIAGNOSIS — Z12.5 ENCOUNTER FOR SCREENING FOR MALIGNANT NEOPLASM OF PROSTATE: ICD-10-CM

## 2020-02-06 PROBLEM — N50.819 TESTICULAR PAIN: Status: ACTIVE | Noted: 2020-02-06

## 2020-02-06 PROCEDURE — 99204 OFFICE O/P NEW MOD 45 MIN: CPT

## 2020-02-06 NOTE — PHYSICAL EXAM
[General Appearance - Well Developed] : well developed [Normal Appearance] : normal appearance [General Appearance - Well Nourished] : well nourished [Well Groomed] : well groomed [General Appearance - In No Acute Distress] : no acute distress [Edema] : no peripheral edema [Respiration, Rhythm And Depth] : normal respiratory rhythm and effort [Exaggerated Use Of Accessory Muscles For Inspiration] : no accessory muscle use [Abdomen Soft] : soft [Costovertebral Angle Tenderness] : no ~M costovertebral angle tenderness [Abdomen Tenderness] : non-tender [Urethral Meatus] : meatus normal [Urinary Bladder Findings] : the bladder was normal on palpation [Scrotum] : the scrotum was normal [Testes Mass (___cm)] : there were no testicular masses [No Prostate Nodules] : no prostate nodules [] : no rash [Normal Station and Gait] : the gait and station were normal for the patient's age [No Focal Deficits] : no focal deficits [Oriented To Time, Place, And Person] : oriented to person, place, and time [Mood] : the mood was normal [Not Anxious] : not anxious [Affect] : the affect was normal [No Palpable Adenopathy] : no palpable adenopathy

## 2020-02-09 LAB
APPEARANCE: CLEAR
BACTERIA UR CULT: NORMAL
BACTERIA: NEGATIVE
BILIRUBIN URINE: NEGATIVE
BLOOD URINE: NEGATIVE
C TRACH RRNA SPEC QL NAA+PROBE: NOT DETECTED
COLOR: NORMAL
GLUCOSE QUALITATIVE U: NEGATIVE
HYALINE CASTS: 0 /LPF
KETONES URINE: NEGATIVE
LEUKOCYTE ESTERASE URINE: NEGATIVE
MICROSCOPIC-UA: NORMAL
N GONORRHOEA RRNA SPEC QL NAA+PROBE: NOT DETECTED
NITRITE URINE: NEGATIVE
PH URINE: 7
PROTEIN URINE: NEGATIVE
RED BLOOD CELLS URINE: 0 /HPF
SOURCE AMPLIFICATION: NORMAL
SPECIFIC GRAVITY URINE: 1.01
SQUAMOUS EPITHELIAL CELLS: 0 /HPF
UROBILINOGEN URINE: NORMAL
WHITE BLOOD CELLS URINE: 0 /HPF

## 2020-02-14 NOTE — ADDENDUM
[FreeTextEntry1] : Entered by Can Benedict, acting as scribe for Dr. Abel Prescott.\par \par The documentation recorded by the scribe accurately reflects the service I personally performed and the decisions made by me.

## 2020-02-14 NOTE — LETTER BODY
[FreeTextEntry1] : Rachell Beard MD\par [address]\[place] [address]\par [phone]\par \par Dear Dr. Beard, \par \par Reason for Visit: Elevated PSA. Testicular pain.\par \par This is a 69 year-old male  with history of perianal abscess, presenting with elevated PSA and testicular pain. He was previously seen by my colleague, Dr. Katherin Romero, 5 years ago. Patient reports that he has not had previous prostate biopsy or prostate MRI because he is afraid of MRI's. By self-report, his current PSA is elevated, but he does not know the result. He reports the laboratory results should have been faxed over, but I have not received anything from your office. Patient denies any hematuria or lower urinary tract symptoms. He complains of testicular pain. He reports the testicle aches and feels soft as though it has fallen apart. The patient denies any aggravating or relieving factors. The patient denies any interference of function. All other review of systems are negative. He has previously undergone partial colectomy. Past medical history, family history and social history were inquired and were noncontributory to current condition. The patient does not use tobacco or drink alcohol. Medications and allergies were reviewed. He is not currently taking any medications. He has no known allergies to medication. \par \par On examination, the patient is a healthy-appearing gentleman in no acute distress. He is alert and oriented follows commands. He has normal mood and affect. He is normocephalic. Neck is supple. Oral no thrush. Respirations are unlabored. His abdomen is soft and nontender. Bladder is nonpalpable. No CVA tenderness. Neurologically he is grossly intact. No peripheral edema. Skin without gross abnormality. He has normal male external genitalia. Normal meatus. Bilateral testes are descended intrascrotally and normal to palpation. \par \par Assessment: Elevated PSA. Testicular pain.\par \par I counseled the patient at length. He declined MARIANO examination today. I discussed with him the risk of occult malignancy. I recommended he obtain a prostate biopsy. Risks and alternatives were discussed. I answered the patient questions. He declines biopsy. The patient has a history of noncompliance with following his PSA. In terms of his testicular pain, the patient will obtain urinalysis, urine culture, chlamydia testing, and testicular ultrasound to further evaluate. The patient will follow-up as directed and will contact me with any questions or concerns. Thank you for the opportunity to participate in the care of Mr. VARMA. I will keep you updated on his progress.\par \par Plan: Urinalysis. Culture. Chlamydia/GC amplification. Testicular US. Follow up as directed.\par \par PSA 7.55. Consider prostate biopsy.

## 2020-02-19 ENCOUNTER — FORM ENCOUNTER (OUTPATIENT)
Age: 70
End: 2020-02-19

## 2020-02-20 ENCOUNTER — OUTPATIENT (OUTPATIENT)
Dept: OUTPATIENT SERVICES | Facility: HOSPITAL | Age: 70
LOS: 1 days | End: 2020-02-20
Payer: COMMERCIAL

## 2020-02-20 ENCOUNTER — APPOINTMENT (OUTPATIENT)
Dept: ULTRASOUND IMAGING | Facility: IMAGING CENTER | Age: 70
End: 2020-02-20
Payer: COMMERCIAL

## 2020-02-20 DIAGNOSIS — Z12.5 ENCOUNTER FOR SCREENING FOR MALIGNANT NEOPLASM OF PROSTATE: ICD-10-CM

## 2020-02-20 DIAGNOSIS — Z98.89 OTHER SPECIFIED POSTPROCEDURAL STATES: Chronic | ICD-10-CM

## 2020-02-20 PROCEDURE — 76870 US EXAM SCROTUM: CPT

## 2020-02-20 PROCEDURE — 76870 US EXAM SCROTUM: CPT | Mod: 26

## 2020-03-02 ENCOUNTER — APPOINTMENT (OUTPATIENT)
Dept: UROLOGY | Facility: CLINIC | Age: 70
End: 2020-03-02
Payer: COMMERCIAL

## 2020-03-02 VITALS — HEART RATE: 79 BPM | DIASTOLIC BLOOD PRESSURE: 81 MMHG | TEMPERATURE: 98 F | SYSTOLIC BLOOD PRESSURE: 150 MMHG

## 2020-03-02 DIAGNOSIS — Z00.00 ENCOUNTER FOR GENERAL ADULT MEDICAL EXAMINATION W/OUT ABNORMAL FINDINGS: ICD-10-CM

## 2020-03-02 PROCEDURE — 99214 OFFICE O/P EST MOD 30 MIN: CPT

## 2020-03-02 NOTE — LETTER BODY
[FreeTextEntry1] : \par Rachell Beard MD\par [address]\[place] [address]\par [phone]\par \par Dear Dr. Beard, \par \par Reason for Visit: Elevated PSA. Testicular pain.\par \par This is a 69 year-old male  with history of perianal abscess, presenting with elevated PSA and testicular pain. Patient returns today for follow-up. Since his last visit, patient obtained a PSA of 7.55. He also underwent testicular ultrasound that was unremarkable except for bilateral varicoceles. Patient reports that he is not concerned with his testicular pain. The patient denies any aggravating or relieving factors. The patient denies any interference of function. All other review of systems are negative. He has previously undergone partial colectomy. Past medical history, family history and social history were unchanged. Medications and allergies were reviewed. He is not currently taking any medications. He has no known allergies to medication. \par \par On examination, the patient is a healthy-appearing gentleman in no acute distress. He is alert and oriented follows commands. He has normal mood and affect. He is normocephalic. Neck is supple. Oral no thrush. Respirations are unlabored. His abdomen is soft and nontender. Bladder is nonpalpable. No CVA tenderness. Neurologically he is grossly intact. No peripheral edema. Skin without gross abnormality. He has normal male external genitalia. Normal meatus. Bilateral testes are descended intrascrotally and normal to palpation. \par \par I personally reviewed testicular ultrasound images with the patient today. Images demonstrated bilateral varicoceles, otherwise unremarkable.\par \par His PSA is 7.55, which is elevated. His urinalysis was unremarkable. His urine culture was negative. His chlamydia/GC amplification was negative.\par \par Assessment: Elevated PSA. Testicular pain.\par \par I counseled the patient at length. Patient's PSA is elevated. I discussed the risk of occult malignancy. I discussed his options for further evaluation including prostate MRI and prostate biopsy.  I counseled the patient regarding the procedures. Patient would like to proceed with prostate MRI. The risks and benefits were discussed. Alternatives were given. I answered the patient questions. The patient will take the necessary preparations for the procedure, including fleet enema. In terms of his testicular pain, patient's ultrasound was unremarkable and his urine culture and chlamydia/gc amplification were negative. He reports minimal bother from his tesitcular discomfort. The patient will follow-up as directed and will contact me with any questions or concerns. Thank you for the opportunity to participate in the care of Mr. VARMA. I will keep you updated on his progress.\par \par Plan: Prostate MRI. Fleet enema. Follow up as directed.

## 2020-03-02 NOTE — ADDENDUM
[FreeTextEntry1] : Entered by Noe Cronin, acting as scribe for Dr. Abel Prescott.\par \par The documentation recorded by the scribe accurately reflects the service I personally performed and the decisions made by me.

## 2020-04-28 ENCOUNTER — APPOINTMENT (OUTPATIENT)
Dept: OTOLARYNGOLOGY | Facility: CLINIC | Age: 70
End: 2020-04-28

## 2020-04-30 ENCOUNTER — TRANSCRIPTION ENCOUNTER (OUTPATIENT)
Age: 70
End: 2020-04-30

## 2020-05-11 ENCOUNTER — EMERGENCY (EMERGENCY)
Facility: HOSPITAL | Age: 70
LOS: 1 days | Discharge: HOME | End: 2020-05-11
Attending: EMERGENCY MEDICINE | Admitting: EMERGENCY MEDICINE
Payer: COMMERCIAL

## 2020-05-11 VITALS
HEART RATE: 80 BPM | OXYGEN SATURATION: 100 % | RESPIRATION RATE: 16 BRPM | SYSTOLIC BLOOD PRESSURE: 174 MMHG | DIASTOLIC BLOOD PRESSURE: 99 MMHG | TEMPERATURE: 99 F

## 2020-05-11 VITALS
OXYGEN SATURATION: 100 % | TEMPERATURE: 98 F | HEART RATE: 73 BPM | SYSTOLIC BLOOD PRESSURE: 149 MMHG | DIASTOLIC BLOOD PRESSURE: 82 MMHG | RESPIRATION RATE: 17 BRPM

## 2020-05-11 DIAGNOSIS — Z98.89 OTHER SPECIFIED POSTPROCEDURAL STATES: Chronic | ICD-10-CM

## 2020-05-11 LAB
ALBUMIN SERPL ELPH-MCNC: 4.2 G/DL — SIGNIFICANT CHANGE UP (ref 3.3–5)
ALP SERPL-CCNC: 90 U/L — SIGNIFICANT CHANGE UP (ref 40–120)
ALT FLD-CCNC: 13 U/L — SIGNIFICANT CHANGE UP (ref 4–41)
ANION GAP SERPL CALC-SCNC: 13 MMO/L — SIGNIFICANT CHANGE UP (ref 7–14)
AST SERPL-CCNC: 20 U/L — SIGNIFICANT CHANGE UP (ref 4–40)
BASOPHILS # BLD AUTO: 0.04 K/UL — SIGNIFICANT CHANGE UP (ref 0–0.2)
BASOPHILS NFR BLD AUTO: 0.9 % — SIGNIFICANT CHANGE UP (ref 0–2)
BILIRUB SERPL-MCNC: 0.8 MG/DL — SIGNIFICANT CHANGE UP (ref 0.2–1.2)
BUN SERPL-MCNC: 8 MG/DL — SIGNIFICANT CHANGE UP (ref 7–23)
CALCIUM SERPL-MCNC: 8.9 MG/DL — SIGNIFICANT CHANGE UP (ref 8.4–10.5)
CHLORIDE SERPL-SCNC: 104 MMOL/L — SIGNIFICANT CHANGE UP (ref 98–107)
CO2 SERPL-SCNC: 23 MMOL/L — SIGNIFICANT CHANGE UP (ref 22–31)
CREAT SERPL-MCNC: 0.79 MG/DL — SIGNIFICANT CHANGE UP (ref 0.5–1.3)
EOSINOPHIL # BLD AUTO: 0.06 K/UL — SIGNIFICANT CHANGE UP (ref 0–0.5)
EOSINOPHIL NFR BLD AUTO: 1.4 % — SIGNIFICANT CHANGE UP (ref 0–6)
GLUCOSE SERPL-MCNC: 94 MG/DL — SIGNIFICANT CHANGE UP (ref 70–99)
HCT VFR BLD CALC: 45.3 % — SIGNIFICANT CHANGE UP (ref 39–50)
HGB BLD-MCNC: 15.1 G/DL — SIGNIFICANT CHANGE UP (ref 13–17)
IMM GRANULOCYTES NFR BLD AUTO: 0.2 % — SIGNIFICANT CHANGE UP (ref 0–1.5)
LYMPHOCYTES # BLD AUTO: 0.73 K/UL — LOW (ref 1–3.3)
LYMPHOCYTES # BLD AUTO: 17 % — SIGNIFICANT CHANGE UP (ref 13–44)
MAGNESIUM SERPL-MCNC: 2.3 MG/DL — SIGNIFICANT CHANGE UP (ref 1.6–2.6)
MCHC RBC-ENTMCNC: 31.4 PG — SIGNIFICANT CHANGE UP (ref 27–34)
MCHC RBC-ENTMCNC: 33.3 % — SIGNIFICANT CHANGE UP (ref 32–36)
MCV RBC AUTO: 94.2 FL — SIGNIFICANT CHANGE UP (ref 80–100)
MONOCYTES # BLD AUTO: 0.45 K/UL — SIGNIFICANT CHANGE UP (ref 0–0.9)
MONOCYTES NFR BLD AUTO: 10.5 % — SIGNIFICANT CHANGE UP (ref 2–14)
NEUTROPHILS # BLD AUTO: 3.01 K/UL — SIGNIFICANT CHANGE UP (ref 1.8–7.4)
NEUTROPHILS NFR BLD AUTO: 70 % — SIGNIFICANT CHANGE UP (ref 43–77)
NRBC # FLD: 0 K/UL — SIGNIFICANT CHANGE UP (ref 0–0)
PHOSPHATE SERPL-MCNC: 3.1 MG/DL — SIGNIFICANT CHANGE UP (ref 2.5–4.5)
PLATELET # BLD AUTO: 157 K/UL — SIGNIFICANT CHANGE UP (ref 150–400)
PMV BLD: 11.8 FL — SIGNIFICANT CHANGE UP (ref 7–13)
POTASSIUM SERPL-MCNC: 4.2 MMOL/L — SIGNIFICANT CHANGE UP (ref 3.5–5.3)
POTASSIUM SERPL-SCNC: 4.2 MMOL/L — SIGNIFICANT CHANGE UP (ref 3.5–5.3)
PROT SERPL-MCNC: 7.1 G/DL — SIGNIFICANT CHANGE UP (ref 6–8.3)
RBC # BLD: 4.81 M/UL — SIGNIFICANT CHANGE UP (ref 4.2–5.8)
RBC # FLD: 13.8 % — SIGNIFICANT CHANGE UP (ref 10.3–14.5)
SODIUM SERPL-SCNC: 140 MMOL/L — SIGNIFICANT CHANGE UP (ref 135–145)
TSH SERPL-MCNC: 2.9 UIU/ML — SIGNIFICANT CHANGE UP (ref 0.27–4.2)
VIT B12 SERPL-MCNC: 214 PG/ML — SIGNIFICANT CHANGE UP (ref 200–900)
WBC # BLD: 4.3 K/UL — SIGNIFICANT CHANGE UP (ref 3.8–10.5)
WBC # FLD AUTO: 4.3 K/UL — SIGNIFICANT CHANGE UP (ref 3.8–10.5)

## 2020-05-11 PROCEDURE — 99284 EMERGENCY DEPT VISIT MOD MDM: CPT

## 2020-05-11 PROCEDURE — 70498 CT ANGIOGRAPHY NECK: CPT | Mod: 26

## 2020-05-11 PROCEDURE — 70496 CT ANGIOGRAPHY HEAD: CPT | Mod: 26

## 2020-05-11 NOTE — ED ADULT NURSE NOTE - NSIMPLEMENTINTERV_GEN_ALL_ED
Implemented All Fall Risk Interventions:  [place] to call system. Call bell, personal items and telephone within reach. Instruct patient to call for assistance. Room bathroom lighting operational. Non-slip footwear when patient is off stretcher. Physically safe environment: no spills, clutter or unnecessary equipment. Stretcher in lowest position, wheels locked, appropriate side rails in place. Provide visual cue, wrist band, yellow gown, etc. Monitor gait and stability. Monitor for mental status changes and reorient to person, place, and time. Review medications for side effects contributing to fall risk. Reinforce activity limits and safety measures with patient and family.

## 2020-05-11 NOTE — ED ADULT TRIAGE NOTE - CHIEF COMPLAINT QUOTE
Pt. states he's been feeling unbalanced with weakness in legs when walking since last night. Ambulating in triage. Denies dizziness, cp, sob, fevers. No known Covid exposure.

## 2020-05-11 NOTE — ED ADULT NURSE NOTE - CHPI ED NUR SYMPTOMS NEG
no fever/no pain/no vomiting/no tingling/no nausea/no chills/no decreased eating/drinking/no dizziness

## 2020-05-11 NOTE — ED PROVIDER NOTE - NONTENDER LOCATION
left upper quadrant/right upper quadrant/suprapubic/left lower quadrant/umbilical/right costovertebral angle/right lower quadrant/periumbilical/left costovertebral angle

## 2020-05-11 NOTE — ED PROVIDER NOTE - PATIENT PORTAL LINK FT
You can access the FollowMyHealth Patient Portal offered by [facility] by registering at the following website: [email or web address]. By joining [employer]’s FollowMyHealth portal, you will also be able to view your health information using other applications (apps) compatible with our system.

## 2020-05-11 NOTE — ED PROVIDER NOTE - CLINICAL SUMMARY MEDICAL DECISION MAKING FREE TEXT BOX
Pt is a 69 y/o M PMHx sleep apnea, anxiety, BPH, colon resection p/w imbalance last night. -- imbalance, not clinically concerning for spinal cord compression, no focal weakness -- labs, vitamin b12, TSH, CT head

## 2020-05-11 NOTE — ED PROVIDER NOTE - ATTENDING CONTRIBUTION TO CARE
Dr. North:  I performed a face to face bedside interview with patient regarding history of present illness, review of symptoms and past medical history. I completed an independent physical exam.  I have discussed patient's plan of care with PA.   I agree with note as stated above, having amended the EMR as needed to reflect my findings.   This includes HISTORY OF PRESENT ILLNESS, HIV, PAST MEDICAL/SURGICAL/FAMILY/SOCIAL HISTORY, ALLERGIES AND HOME MEDICATIONS, REVIEW OF SYSTEMS, PHYSICAL EXAM, and any PROGRESS NOTES during the time I functioned as the attending physician for this patient.    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  Denies dizziness, lightheadedness, room spinning, headache; also denies cp, sob, n/v/d, focal weakness/numbness.  States he has been using Miralax last few weeks for some constipation.      Exam:  - nad  - rrr  - ctab   -abd soft ntnd  - normal gait, finger-to-nose intact, heel-to-shin intact, no pronator drift, 5/5 strength bilateral extremities    A/P  - feeling off balance but resolved, consider TIA, r/o electrolyte abnl, r/o NPH  - cbc, cmp, mg, phos, CT head

## 2020-05-11 NOTE — ED PROVIDER NOTE - NSFOLLOWUPINSTRUCTIONS_ED_ALL_ED_FT
1) Please follow up with your Primary Care Provider in 24-48 hours  2) Seek immediate medical care for any new or returning symptoms including but not limited severe pain, high fevers, difficulties walking, difficulties with vision  3) Please follow up with Neurology

## 2020-05-11 NOTE — ED PROVIDER NOTE - OBJECTIVE STATEMENT
Pt is a 69 y/o M PMHx sleep apnea, anxiety, BPH, colon resection p/w imbalance last night.  Pt states last night while walking around house, pt noticed he felt off balance.  He states he felt as though he needed to "be more careful" when he walked.  Since this morning, pt states he feels as though his gait is returning back to normal.  Pt notes he has been using Miralax as needed for constipation; last BM was today.  He denies any fevers, chills, nausea, vomiting, diarrhea, numbness, weakness, room spinning dizziness, lightheadedness, headache, palpitations, back pain, incontinence, saddle anesthesia, visual/auditory disturbances, difficulty walking, falls, trauma, illicit drug use, or any other specific complaints. Never smoker

## 2020-05-11 NOTE — ED PROVIDER NOTE - NSFOLLOWUPCLINICS_GEN_ALL_ED_FT
[facility]  Neurology  [address] [place] [address] [address] [place] [address]  Phone: [phone]  Fax:   Follow Up Time:

## 2020-05-11 NOTE — ED ADULT NURSE NOTE - OBJECTIVE STATEMENT
Pt c/o increased concentration needed to ambulate.  Pt denies dizziness or lightheadedness, denies feeling faint or syncope.  Pt also states he had anxiety attack last night worried that his sx are related to hx of and impending sbo.  Pt states he's concerned that because he cannot have hernia surgery he will get another sbo.  Pt moves all extremities well, equal strength x 4, perrla, afebrile.   Pt appears anxious.

## 2020-06-01 ENCOUNTER — APPOINTMENT (OUTPATIENT)
Dept: SURGERY | Facility: CLINIC | Age: 70
End: 2020-06-01
Payer: COMMERCIAL

## 2020-06-01 VITALS
BODY MASS INDEX: 29.26 KG/M2 | TEMPERATURE: 98.3 F | SYSTOLIC BLOOD PRESSURE: 147 MMHG | DIASTOLIC BLOOD PRESSURE: 91 MMHG | HEIGHT: 72 IN | WEIGHT: 216 LBS | HEART RATE: 77 BPM

## 2020-06-01 DIAGNOSIS — K43.2 INCISIONAL HERNIA W/OUT OBSTRUCTION OR GANGRENE: ICD-10-CM

## 2020-06-01 PROCEDURE — 99203 OFFICE O/P NEW LOW 30 MIN: CPT

## 2020-06-01 NOTE — HISTORY OF PRESENT ILLNESS
[de-identified] : 69 yo male with h/o TANGELA  presents for evaluation of a symptomatic incisional hernia. He states a h/o laparotomy for cecal volvulus 40 years ago and then a subsequent laparotomy 9 months later for a perforation. He has been well until recently when he began to experience intermittent abdominal pains. Recent CT reveal a small incisional hernia just left of midline around the umbilicus. He denies any nausea or vomiting.

## 2020-06-01 NOTE — PHYSICAL EXAM
[Respiratory Effort] : normal respiratory effort [Alert] : alert [Oriented to Place] : oriented to place [Oriented to Person] : oriented to person [Oriented to Time] : oriented to time [Calm] : calm [JVD] : no jugular venous distention  [de-identified] : BARR[place] AZUL NAD [de-identified] : soft NT ND obese. Small reducible incisional hernia [de-identified] : EOMI

## 2020-06-01 NOTE — ASSESSMENT
[FreeTextEntry1] : 69 yo male with a symptomatic incisional hernia. Will schedule out patient open repair.

## 2020-06-30 ENCOUNTER — APPOINTMENT (OUTPATIENT)
Dept: PHARMACY | Facility: CLINIC | Age: 70
End: 2020-06-30

## 2020-07-29 ENCOUNTER — APPOINTMENT (OUTPATIENT)
Dept: PHARMACY | Facility: CLINIC | Age: 70
End: 2020-07-29

## 2020-08-18 ENCOUNTER — APPOINTMENT (OUTPATIENT)
Dept: ORTHOPEDIC SURGERY | Facility: CLINIC | Age: 70
End: 2020-08-18
Payer: COMMERCIAL

## 2020-08-18 VITALS
HEART RATE: 73 BPM | DIASTOLIC BLOOD PRESSURE: 71 MMHG | WEIGHT: 217 LBS | BODY MASS INDEX: 29.39 KG/M2 | OXYGEN SATURATION: 94 % | SYSTOLIC BLOOD PRESSURE: 124 MMHG | HEIGHT: 72 IN

## 2020-08-18 DIAGNOSIS — M76.61 ACHILLES TENDINITIS, RIGHT LEG: ICD-10-CM

## 2020-08-18 DIAGNOSIS — Z82.49 FAMILY HISTORY OF ISCHEMIC HEART DISEASE AND OTHER DISEASES OF THE CIRCULATORY SYSTEM: ICD-10-CM

## 2020-08-18 PROCEDURE — 99204 OFFICE O/P NEW MOD 45 MIN: CPT

## 2020-08-18 PROCEDURE — 73610 X-RAY EXAM OF ANKLE: CPT | Mod: RT

## 2020-08-18 RX ORDER — LORATADINE 5 MG
TABLET,CHEWABLE ORAL
Refills: 0 | Status: ACTIVE | COMMUNITY

## 2020-08-18 NOTE — DISCUSSION/SUMMARY
[de-identified] : Right Achilles tendinitis\par \par I discussed the findings and history exam and radiology\par \par Activity modification\par \par Physical therapy\par \par The patient was prescribed Diclofenac PO non-steroidal anti-inflammatory medication. 50mg tablets twice daily to be taken for at least 1-2 weeks in a row and then PRN afterwards. Risks and benefits were discussed and include but not limited to renal damage and GI ulceration and bleeding.  They were advised to take with food to limit stomach upset as well as warned to stop the medication if worsening gastric pain or dizziness or other side effects. Also to immediately stop the medication and seek appropriate medical attention if any severe stomach ache, gastritis, black/red vomit, black/red stools or any other medical concern.\par \par \par Elevate ice compression\par \par Followup p.r.n.

## 2020-08-18 NOTE — PHYSICAL EXAM
[de-identified] : Physical Examination\par General: well nourished, in no acute distress, alert and oriented to person, place and time\par Psychiatric: normal mood and affect, no abnormal movements or speech patterns\par Eyes: vision intact + glasses\par Throat: no thyromegaly\par Lymph: no enlarged nodes, no lymphedema in extremity\par Respiratory: no wheezing, no shortness of breath with ambulation\par Cardiac: no cardiac leg swelling, 2+ peripheral pulses\par Neurology: normal gross sensation in extremities to light touch\par Abdomen: soft, non-tender, tympanic, no masses\par \par Musculoskeletal Examination\par Ambulation	+ antalgic gait, + assistive devices\par \par Ankle			Right			Left\par General\par 	Deformity       	normal			normal	\par 	Skin/Edema   	normal			normal\par 	Erythema       	-			-\par 	Alignment      	neutral			neutral\par Range of Motion\par 	Ankle Dorsiflex	20			20\par 	Ankle Plantarflex	45			45\par 	Inversion        	15			15\par 	Eversion		5			5\par Drawer\par 	ATFL		-			-\par 	CFL	                	-			-\par 	PTFL		-			-\par Palpation\par 	ATFL		-			-\par 	Medial Heel   	-			-\par 	Other		achilles 2-3cm prox to the insertion			-\par Strength Examination/Atrophy\par 	EHL 		5+			5+\par 	FHL 		5+			5+\par 	Tibialis Anterior	5+			5+\par 	Achilles/Soleus	5+			5+\par Sensation\par 	Deep Peroneal	normal			normal\par 	Super Peroneal 	normal			normal\par 	Sural 		normal			normal\par 	Posterior Tibial 	normal			normal\par 	Saphenous    	normal			normal\par Pulses\par 	DP   		2+			2+\par \par \par  [de-identified] : 3 views of the affected R ankle, (AP, Oblique and Lateral)\par were ordered, obtained and evaluated by myself today and\par demonstrate:\par [Is] intact Mortise\par [Normal] Talus contour [without] cysts\par - osteophtes\par - Os Trigonum\par - spurring\par [No] soft tissue calcification

## 2020-08-18 NOTE — HISTORY OF PRESENT ILLNESS
[de-identified] : CC R ankle\par \par HPI 71 yo male presents with acute onset of 7 days of activity related pain in the posterior right ankle after cardiac stress test 6 weeks ago. The pain is same, and rated a 2 out of 10, described as aching, [without radiation]. Rest makes the pain better and walking makes the pain worse. The patient reports associated symptoms of pain and swelling from varicose vein. The patient - similar pain previously. \par \par The patient has tried the following treatments:\par Activity modification	+\par Ice/Compression  	+\par Braces    		-\par Nsaids    		-\par Physical Therapy  	-\par Cortisone Injection	-\par Arthroscopy		-\par \par \par Review of Systems is positive for the above musculoskeletal symptoms and is otherwise non-contributory for general, constitutional, psychiatric, neurologic, HEENT, cardiac, respiratory, gastrointestinal, reproductive, lymphatic, and dermatologic complaints.\par \par Consult by Parul

## 2020-08-26 NOTE — ED PROVIDER NOTE - CROS ED GI ALL NEG
Onset about 3-4 months ago.  Pt reports suprapubic and RLQ discomfort intermittently.  Symptoms are sometimes associated with nausea, vomiting, diarrhea.  Pain is sharp and achy.  She tries to rest when symptoms occur and it then self resolves after about 24 hours.  She has seen GI in the past re: abdominal pain.  She reports symptoms started after having D&C for hemorrhoids and that it feels similar to when she had hemorrhoids in the past.  No weight loss.  
Onset: 1 day  Had pelvic pressure, blush redness noted when wiping and pressure-like discomfort with urination yesterday. Has mild low back pain. Denies vaginal bleeding, but states that the blood she noticed after wiping could be coming from her vagina.  No abnormal vaginal discharge.  She stayed home all day and was in bed due to having stomach pains, decreased appetite.  She drank ensure to stay well hydrated.  States she had pressure with urination but no pain.  No urgency or frequency today.  No bleeding today and pelvic discomfort has improved.  No fevers or chills.  States she had similar issues in the past which resolve after a day.   She has not taken anything to help improve symptoms.  
She reports feeling more irritable lately than normal and feels that her co-workers are unprofessional and are not reliable, stab each other in the back and that her boss is not supportive.    
negative...

## 2020-09-01 ENCOUNTER — APPOINTMENT (OUTPATIENT)
Dept: PHARMACY | Facility: CLINIC | Age: 70
End: 2020-09-01

## 2020-09-29 ENCOUNTER — APPOINTMENT (OUTPATIENT)
Dept: PHARMACY | Facility: CLINIC | Age: 70
End: 2020-09-29
Payer: COMMERCIAL

## 2020-09-29 PROCEDURE — V5257F: CUSTOM

## 2020-11-30 ENCOUNTER — TRANSCRIPTION ENCOUNTER (OUTPATIENT)
Age: 70
End: 2020-11-30

## 2020-12-22 ENCOUNTER — APPOINTMENT (OUTPATIENT)
Dept: PHARMACY | Facility: CLINIC | Age: 70
End: 2020-12-22
Payer: SELF-PAY

## 2020-12-22 PROCEDURE — V5299A: CUSTOM | Mod: NC

## 2021-04-29 ENCOUNTER — APPOINTMENT (OUTPATIENT)
Dept: ORTHOPEDIC SURGERY | Facility: CLINIC | Age: 71
End: 2021-04-29

## 2021-05-03 DIAGNOSIS — M17.12 UNILATERAL PRIMARY OSTEOARTHRITIS, LEFT KNEE: ICD-10-CM

## 2021-05-04 ENCOUNTER — APPOINTMENT (OUTPATIENT)
Dept: ORTHOPEDIC SURGERY | Facility: CLINIC | Age: 71
End: 2021-05-04

## 2021-05-11 ENCOUNTER — APPOINTMENT (OUTPATIENT)
Dept: UROLOGY | Facility: CLINIC | Age: 71
End: 2021-05-11
Payer: COMMERCIAL

## 2021-05-11 VITALS — SYSTOLIC BLOOD PRESSURE: 148 MMHG | RESPIRATION RATE: 17 BRPM | HEART RATE: 71 BPM | DIASTOLIC BLOOD PRESSURE: 78 MMHG

## 2021-05-11 DIAGNOSIS — R36.1 HEMATOSPERMIA: ICD-10-CM

## 2021-05-11 PROCEDURE — 99072 ADDL SUPL MATRL&STAF TM PHE: CPT

## 2021-05-11 PROCEDURE — 99213 OFFICE O/P EST LOW 20 MIN: CPT

## 2021-05-11 RX ORDER — DICLOFENAC SODIUM 50 MG/1
50 TABLET, DELAYED RELEASE ORAL
Qty: 60 | Refills: 1 | Status: DISCONTINUED | COMMUNITY
Start: 2020-08-18 | End: 2021-05-11

## 2021-05-11 RX ORDER — ENEMA 19; 7 G/133ML; G/133ML
7-19 ENEMA RECTAL
Qty: 2 | Refills: 0 | Status: DISCONTINUED | COMMUNITY
Start: 2020-03-02 | End: 2021-05-11

## 2021-05-11 NOTE — HISTORY OF PRESENT ILLNESS
[FreeTextEntry1] : 72 y/o male with h/o elevated PSA, c/o reddish semen on ejaculation 3 days ago. no hematuria. no use of antithrombotics or anticoagulants. c/o left testicular aches x 3 days. minimal swelling. scrotal ultrasound 1 year ago showed b/l Varicocele. no fever. no dysuria. nocturia 2-4 times but for good amount and strong stream of urine. urgency and occasional urge incontinence. \par PSA: 5.2 i with Free PSA  25.8% in 2015. repeat PSA 1 year ago PSA was 7.3 with free PSA: 19% . patient was offered MRI of prostate in the past but did not do it. he is now concerned about evaluating thr elevated PSA

## 2021-05-11 NOTE — PHYSICAL EXAM
[General Appearance - Well Developed] : well developed [General Appearance - Well Nourished] : well nourished [Normal Appearance] : normal appearance [Well Groomed] : well groomed [General Appearance - In No Acute Distress] : no acute distress [Abdomen Soft] : soft [Abdomen Tenderness] : non-tender [Costovertebral Angle Tenderness] : no ~M costovertebral angle tenderness [Penis Abnormality] : normal circumcised penis [Testes Tenderness] : no tenderness of the testes [Testes Mass (___cm)] : there were no testicular masses [Prostate Size ___ gm] : prostate size [unfilled] gm [Urinary Bladder Findings] : the bladder was normal on palpation [Scrotum] : the scrotum was normal [Epididymis] : the epididymides were normal [Rectal Exam - Rectum] : digital rectal exam was normal [No Prostate Nodules] : no prostate nodules [Edema] : no peripheral edema [] : no respiratory distress [Exaggerated Use Of Accessory Muscles For Inspiration] : no accessory muscle use [Oriented To Time, Place, And Person] : oriented to person, place, and time [Normal Station and Gait] : the gait and station were normal for the patient's age [No Focal Deficits] : no focal deficits

## 2021-05-11 NOTE — ASSESSMENT
[FreeTextEntry1] : 70 y/o male with h/o elevated PSA, hematospermia, left testicular pain\par -u/a\par -PSA free and total\par -may need TRUS according to PSA test results

## 2021-05-12 LAB
APPEARANCE: CLEAR
BACTERIA: NEGATIVE
BILIRUBIN URINE: NEGATIVE
BLOOD URINE: NEGATIVE
COLOR: YELLOW
GLUCOSE QUALITATIVE U: NEGATIVE
HYALINE CASTS: 0 /LPF
KETONES URINE: NEGATIVE
LEUKOCYTE ESTERASE URINE: NEGATIVE
MICROSCOPIC-UA: NORMAL
NITRITE URINE: NEGATIVE
PH URINE: 7.5
PROTEIN URINE: NORMAL
PSA FREE FLD-MCNC: 21 %
PSA FREE SERPL-MCNC: 1.81 NG/ML
PSA SERPL-MCNC: 8.52 NG/ML
RED BLOOD CELLS URINE: 1 /HPF
SPECIFIC GRAVITY URINE: 1.02
SQUAMOUS EPITHELIAL CELLS: 1 /HPF
TRIPLE PHOSPHATE CRYSTALS: ABNORMAL
UROBILINOGEN URINE: ABNORMAL
WHITE BLOOD CELLS URINE: 1 /HPF

## 2021-05-20 ENCOUNTER — APPOINTMENT (OUTPATIENT)
Dept: UROLOGY | Facility: CLINIC | Age: 71
End: 2021-05-20

## 2021-05-21 ENCOUNTER — APPOINTMENT (OUTPATIENT)
Dept: PHARMACY | Facility: CLINIC | Age: 71
End: 2021-05-21
Payer: SELF-PAY

## 2021-05-21 ENCOUNTER — OUTPATIENT (OUTPATIENT)
Dept: OUTPATIENT SERVICES | Facility: HOSPITAL | Age: 71
LOS: 1 days | End: 2021-05-21
Payer: COMMERCIAL

## 2021-05-21 ENCOUNTER — APPOINTMENT (OUTPATIENT)
Dept: UROLOGY | Facility: CLINIC | Age: 71
End: 2021-05-21
Payer: COMMERCIAL

## 2021-05-21 DIAGNOSIS — R97.20 ELEVATED PROSTATE SPECIFIC ANTIGEN [PSA]: ICD-10-CM

## 2021-05-21 DIAGNOSIS — Z98.89 OTHER SPECIFIED POSTPROCEDURAL STATES: Chronic | ICD-10-CM

## 2021-05-21 DIAGNOSIS — R35.0 FREQUENCY OF MICTURITION: ICD-10-CM

## 2021-05-21 PROCEDURE — V5010 ASSESSMENT FOR HEARING AID: CPT | Mod: NC

## 2021-05-21 PROCEDURE — 99072 ADDL SUPL MATRL&STAF TM PHE: CPT

## 2021-05-21 PROCEDURE — 99212 OFFICE O/P EST SF 10 MIN: CPT

## 2021-05-21 PROCEDURE — 76857 US EXAM PELVIC LIMITED: CPT | Mod: 26

## 2021-05-21 PROCEDURE — 76857 US EXAM PELVIC LIMITED: CPT

## 2021-05-21 NOTE — HISTORY OF PRESENT ILLNESS
[FreeTextEntry1] : 72 y/o male with h/o elevated PSA, c/o reddish semen on ejaculation 3 days ago. no hematuria. no use of antithrombotics or anticoagulants. c/o left testicular aches x 3 days. minimal swelling. scrotal ultrasound 1 year ago showed b/l Varicocele. no fever. no dysuria. nocturia 2-4 times but for good amount and strong stream of urine. urgency and occasional urge incontinence. \par PSA: 5.2 i with Free PSA  25.8% in 2015. repeat PSA 1 year ago PSA was 7.3 with free PSA: 19% . patient was offered MRI of prostate in the past but did not do it. he is now concerned about evaluating thr elevated PSA \par \par my repeat 8.5  20% free.\par prostate size today 77 so PSA density 0.11

## 2021-05-21 NOTE — ASSESSMENT
[FreeTextEntry1] : reviewed results - favorable PSA density\par  don't think MRI necessary at this juncture

## 2021-06-15 ENCOUNTER — APPOINTMENT (OUTPATIENT)
Dept: OTOLARYNGOLOGY | Facility: CLINIC | Age: 71
End: 2021-06-15
Payer: COMMERCIAL

## 2021-06-15 VITALS
DIASTOLIC BLOOD PRESSURE: 75 MMHG | BODY MASS INDEX: 29.8 KG/M2 | WEIGHT: 220 LBS | HEART RATE: 74 BPM | SYSTOLIC BLOOD PRESSURE: 134 MMHG | HEIGHT: 72 IN

## 2021-06-15 DIAGNOSIS — H91.93 UNSPECIFIED HEARING LOSS, BILATERAL: ICD-10-CM

## 2021-06-15 DIAGNOSIS — R42 DIZZINESS AND GIDDINESS: ICD-10-CM

## 2021-06-15 PROCEDURE — 99072 ADDL SUPL MATRL&STAF TM PHE: CPT

## 2021-06-15 PROCEDURE — 99203 OFFICE O/P NEW LOW 30 MIN: CPT | Mod: 25

## 2021-06-15 PROCEDURE — 92504 EAR MICROSCOPY EXAMINATION: CPT

## 2021-06-16 PROBLEM — R42 DISEQUILIBRIUM: Status: ACTIVE | Noted: 2021-06-16

## 2021-06-16 PROBLEM — H91.93 BILATERAL HEARING LOSS: Status: ACTIVE | Noted: 2017-12-14

## 2021-06-16 NOTE — PHYSICAL EXAM
[Binocular Microscopic Exam] : Binocular microscopic exam was performed [Hearing Loss Right Only] : diminished [Hearing Loss Left Only] : diminished [Nystagmus] : ~T no ~M nystagmus was seen [Fukuda Step Test] : Fukuda Step Test was Positive [Romberg's Sign] : Romberg's sign was absent [Fistula Sign] : Fistula Sign: Negative [Past-Pointing] : Past-Pointing: Negative [Halina-Hallkathrynke] : Macon-Hallpike: Negative [Midline] : trachea located in midline position [Normal] : no rashes

## 2021-06-16 NOTE — DATA REVIEWED
[de-identified] : I have independently reviewed the patient's audiogram from last week and my findings include karen symmetric HF SNHL, abruptly descending type, normal tymps\par

## 2021-06-28 ENCOUNTER — APPOINTMENT (OUTPATIENT)
Dept: ORTHOPEDIC SURGERY | Facility: CLINIC | Age: 71
End: 2021-06-28
Payer: COMMERCIAL

## 2021-06-28 VITALS
HEART RATE: 71 BPM | HEIGHT: 72 IN | SYSTOLIC BLOOD PRESSURE: 129 MMHG | OXYGEN SATURATION: 96 % | BODY MASS INDEX: 31.15 KG/M2 | DIASTOLIC BLOOD PRESSURE: 77 MMHG | WEIGHT: 230 LBS

## 2021-06-28 DIAGNOSIS — M47.816 SPONDYLOSIS W/OUT MYELOPATHY OR RADICULOPATHY, LUMBAR REGION: ICD-10-CM

## 2021-06-28 DIAGNOSIS — M25.551 PAIN IN RIGHT HIP: ICD-10-CM

## 2021-06-28 PROCEDURE — 72100 X-RAY EXAM L-S SPINE 2/3 VWS: CPT

## 2021-06-28 PROCEDURE — 99214 OFFICE O/P EST MOD 30 MIN: CPT

## 2021-06-28 PROCEDURE — 73502 X-RAY EXAM HIP UNI 2-3 VIEWS: CPT | Mod: RT

## 2021-06-28 PROCEDURE — 99072 ADDL SUPL MATRL&STAF TM PHE: CPT

## 2021-06-28 NOTE — HISTORY OF PRESENT ILLNESS
[Worsening] : worsening [___ yrs] : [unfilled] year(s) ago [1] : a minimum pain level of 1/10 [4] : a maximum pain level of 4/10 [Walking] : walking [Intermit.] : ~He/She~ states the symptoms seem to be intermittent [Hip Movement] : worsened by hip movement [Sitting] : worsened by sitting [Rest] : relieved by rest [de-identified] : Pt presents for initial evaluation with pain in his right hip, pt has no known injury. Pt has taken ibuprofen for pain with relief, pt states he has "trochanteric bursitis for 30 years" Pt has applied Topicia, no buckling of the right lower extremity.  [de-identified] : certain movements [de-identified] : topical medication and Ibuprofen

## 2021-06-28 NOTE — PHYSICAL EXAM
[de-identified] : Patient ambulates with a single cane.  He states he has weakness and other neurologic deficit after receiving the moderna vac vaccine.  On attempting to examine the patient's neurologic status of the lower extremities he did not allow deep tendon reflexes saying that it was too painful for his knees and he would also not allow me moving his hips or knees stating that he would often leave a doctor's office and worse pain following a routine examination.\par The patient is pointing to his midline spine lumbar region where he states he has radiating pain to the right hip; it is not clear whether there is a radicular element related to this [de-identified] : X-rays taken of the right hip and pelvis do not disclose any acute osseous changes.  No soft tissue findings with particular reference to a "30-year history of trochanteric bursitis".\par After allowing us to take an AP lateral x-ray of the patient's lumbosacral spine he is noted to have multilevel degenerative disc space narrowing with particular reference to the L5-S1 disc space.

## 2021-06-28 NOTE — DISCUSSION/SUMMARY
[de-identified] : The patient was advised of his findings and shown his x-rays.  He was informed it does not appear that his 30-year intermittent history of trochanteric bursitis is the cause of his current condition.  A complete orthopedic examination was declined by the patient.  He was advised on speaking with his internist for referral to an appropriate neurologist or spine specialist.  Follow-up as needed

## 2021-08-06 ENCOUNTER — APPOINTMENT (OUTPATIENT)
Dept: PHARMACY | Facility: CLINIC | Age: 71
End: 2021-08-06

## 2022-03-04 ENCOUNTER — EMERGENCY (EMERGENCY)
Facility: HOSPITAL | Age: 72
LOS: 1 days | Discharge: HOME | End: 2022-03-04
Attending: EMERGENCY MEDICINE | Admitting: EMERGENCY MEDICINE
Payer: COMMERCIAL

## 2022-03-04 VITALS
TEMPERATURE: 97 F | DIASTOLIC BLOOD PRESSURE: 88 MMHG | HEIGHT: 76 IN | OXYGEN SATURATION: 100 % | SYSTOLIC BLOOD PRESSURE: 155 MMHG | HEART RATE: 60 BPM | RESPIRATION RATE: 18 BRPM

## 2022-03-04 DIAGNOSIS — Z98.89 OTHER SPECIFIED POSTPROCEDURAL STATES: Chronic | ICD-10-CM

## 2022-03-04 PROCEDURE — 99285 EMERGENCY DEPT VISIT HI MDM: CPT | Mod: 25

## 2022-03-04 PROCEDURE — 71045 X-RAY EXAM CHEST 1 VIEW: CPT | Mod: 26

## 2022-03-04 PROCEDURE — 93010 ELECTROCARDIOGRAM REPORT: CPT

## 2022-03-04 NOTE — ED PROVIDER NOTE - PHYSICAL EXAMINATION
Gen: NAD, sitting down comfortably in bed  HEENT: poor dental hygiene with missing molars on the left. Mild jaw swelling and inflamed gums on the R  CV: bradycardic, but regular rhythm. No murmurs, rubs, or gallops.   Pulm: CTAB, no increased WOB, no wheezing, crackles  Abd: Soft  Ext: +1 pitting edema b/l  Neuro: no focal neurologic deficits

## 2022-03-04 NOTE — ED PROVIDER NOTE - CLINICAL SUMMARY MEDICAL DECISION MAKING FREE TEXT BOX
70 y/o M with PMHx of BPH, TANGELA, colonic resection 40 yrs ago, recent COVID infection 1 month ago, varicose veins presenting with few seconds of exertional chest pain and palpitation that has self-resolved. Physical exam notable for 1+ pitting edema, which is chronic per the pt. EKG showed sinus bradycardia but otherwise unremarkable and normal CXR. Chest pain likely musculoskeletal in nature due to deconditioning from recent COVID infection. Will order troponin to r/o MI and aspirin for sx relief.

## 2022-03-04 NOTE — ED PROVIDER NOTE - NSICDXPASTMEDICALHX_GEN_ALL_CORE_FT
PAST MEDICAL HISTORY:  Anxiety disorder     BPH (benign prostatic hypertrophy)     Cecal volvulus     Panic disorder     Sleep apnea, unspecified type

## 2022-03-04 NOTE — ED PROVIDER NOTE - NSFOLLOWUPINSTRUCTIONS_ED_ALL_ED_FT
You presented to the hospital with chest pain that was self limited and related to exertion. Your chest xray, EKG and labs were normal. You requested to be discharged home with outpatient cardiology follow up. Please follow up with your cardiologist within 1-2 weeks. If your chest pain on exertion continues, a stress test may be warranted at that time. Please return to the hospital for any severe chest pain or shortness of breath.

## 2022-03-04 NOTE — ED PROVIDER NOTE - NSICDXFAMILYHX_GEN_ALL_CORE_FT
FAMILY HISTORY:  Grandparent  Still living? Unknown  Family history of cancer, Age at diagnosis: Age Unknown  Family history of diabetes mellitus, Age at diagnosis: Age Unknown

## 2022-03-04 NOTE — ED PROVIDER NOTE - ATTENDING CONTRIBUTION TO CARE
agree with med student note  "70 y/o M with PMHx of BPH, TANGELA, colonic resection 40 yrs ago, recent COVID infection 1 month ago, varicose veins presenting with exertional chest pain. Pt reports that he experienced a few seconds of pounding and mild chest pain while moving his luggage around 5 hrs ago. He denies any pressure or heaviness in his chest, dyspnea on exertion, radiation of the pain, and the pain self-resolved in a few seconds. Of note, pt had similar sx 3 wks ago, after his COVID infection and presented to the ED but work-up was negative. He also had a complete cardiac work up 2 years ago after an abnormal treadmill stress test, but nuclear stress test and rest of cardiac work up was all negative. He follows with Dr. Martinez at [facility].     	Pt also complains of R-sided dental/jaw pain that started a week ago. He was seen at an urgent care, where he was prescribed a 7d course of Augmentin and has an appointment to follow up with his dentist tmrw morning.     	Pt currently reports feeling fine and wants to go home. He denies any fever, chills, SOB, cough."    pt states just returned from [place] and was moving luggage when he developed less than a minute of CP with palpitations.  States drive not longer than 2 hours.  No hx of DVT/PE.  Denies any symptoms currently including CP/SOB/fever.  Recent dental infection (multiple dental extractions over last 2 years)b  States 2 years ago at [facility] had equivocal exam and then normal nuclear stress and echo.    PE: well appearing; VSS; CTAB/L; s1 s2 no m/r/g abd soft/NT/ND ext: no edema    EKG: sinus bradycardia  atypical hx, no risk factors, EKG wnl; will get labs including trops, and reassess

## 2022-03-04 NOTE — ED PROVIDER NOTE - PROGRESS NOTE DETAILS
NB PGY3: HST negative x 1. CP has not recurred. No events on tele. Plan to d/c with outpatient f/u with cardiologist at [facility].

## 2022-03-04 NOTE — ED PROVIDER NOTE - OBJECTIVE STATEMENT
70 y/o M with PMHx of BPH, TANGELA, colonic resection 40 yrs ago, recent COVID infection 1 month ago, varicose veins presenting with exertional chest pain. Pt reports that he experienced a few seconds of pounding and mild chest pain while moving his luggage around 5 hrs ago. He denies any pressure or heaviness in his chest, dyspnea on exertion, radiation of the pain, and the pain self-resolved in a few seconds. Of note, pt had similar sx 3 wks ago, after his COVID infection and presented to the ED but work-up was negative. He also had a complete cardiac work up 2 years ago after an abnormal treadmill stress test, but nuclear stress test and rest of cardiac work up was all negative. He follows with Dr. Martinez at [facility].     Pt also complains of R-sided dental/jaw pain that started a week ago. He was seen at an urgent care, where he was prescribed a 7d course of Augmentin and has an appointment to follow up with his dentist tmrw morning.     Pt currently reports feeling fine and wants to go home. He denies any fever, chills, SOB, cough.

## 2022-03-04 NOTE — ED PROVIDER NOTE - PATIENT PORTAL LINK FT
You can access the FollowMyHealth Patient Portal offered by [facility] by registering at the following website: [email or web address]. By joining [employer]’s FollowMyHealth portal, you will also be able to view your health information using other applications (apps) compatible with our system.

## 2022-03-04 NOTE — ED PROVIDER NOTE - CROS ED CARDIOVAS ALL NEG
She was recently treated for e coli ESBL with fosfomycin  Her UA is negative today  Will recheck culture   negative...

## 2022-03-04 NOTE — ED ADULT TRIAGE NOTE - CHIEF COMPLAINT QUOTE
Pt st" I had  chest pain today and want to get checked it lasted few seconds...now it is gone...I don't have a cardiac hx." hx of BPH, resection of intestine, recent dental infection.

## 2022-03-05 VITALS
SYSTOLIC BLOOD PRESSURE: 145 MMHG | OXYGEN SATURATION: 100 % | DIASTOLIC BLOOD PRESSURE: 77 MMHG | RESPIRATION RATE: 17 BRPM | TEMPERATURE: 97 F

## 2022-03-05 LAB
ALBUMIN SERPL ELPH-MCNC: 4.1 G/DL — SIGNIFICANT CHANGE UP (ref 3.3–5)
ALP SERPL-CCNC: 75 U/L — SIGNIFICANT CHANGE UP (ref 40–120)
ALT FLD-CCNC: 9 U/L — SIGNIFICANT CHANGE UP (ref 4–41)
ANION GAP SERPL CALC-SCNC: 11 MMOL/L — SIGNIFICANT CHANGE UP (ref 7–14)
AST SERPL-CCNC: 17 U/L — SIGNIFICANT CHANGE UP (ref 4–40)
BASOPHILS # BLD AUTO: 0.04 K/UL — SIGNIFICANT CHANGE UP (ref 0–0.2)
BASOPHILS NFR BLD AUTO: 1 % — SIGNIFICANT CHANGE UP (ref 0–2)
BILIRUB SERPL-MCNC: 0.5 MG/DL — SIGNIFICANT CHANGE UP (ref 0.2–1.2)
BUN SERPL-MCNC: 20 MG/DL — SIGNIFICANT CHANGE UP (ref 7–23)
CALCIUM SERPL-MCNC: 8 MG/DL — LOW (ref 8.4–10.5)
CHLORIDE SERPL-SCNC: 107 MMOL/L — SIGNIFICANT CHANGE UP (ref 98–107)
CO2 SERPL-SCNC: 23 MMOL/L — SIGNIFICANT CHANGE UP (ref 22–31)
CREAT SERPL-MCNC: 0.8 MG/DL — SIGNIFICANT CHANGE UP (ref 0.5–1.3)
EGFR: 95 ML/MIN/1.73M2 — SIGNIFICANT CHANGE UP
EOSINOPHIL # BLD AUTO: 0.11 K/UL — SIGNIFICANT CHANGE UP (ref 0–0.5)
EOSINOPHIL NFR BLD AUTO: 2.6 % — SIGNIFICANT CHANGE UP (ref 0–6)
GLUCOSE SERPL-MCNC: 91 MG/DL — SIGNIFICANT CHANGE UP (ref 70–99)
HCT VFR BLD CALC: 40.9 % — SIGNIFICANT CHANGE UP (ref 39–50)
HGB BLD-MCNC: 13.8 G/DL — SIGNIFICANT CHANGE UP (ref 13–17)
IANC: 2.71 K/UL — SIGNIFICANT CHANGE UP (ref 1.5–8.5)
IMM GRANULOCYTES NFR BLD AUTO: 0.5 % — SIGNIFICANT CHANGE UP (ref 0–1.5)
LYMPHOCYTES # BLD AUTO: 0.85 K/UL — LOW (ref 1–3.3)
LYMPHOCYTES # BLD AUTO: 20.2 % — SIGNIFICANT CHANGE UP (ref 13–44)
MAGNESIUM SERPL-MCNC: 2.3 MG/DL — SIGNIFICANT CHANGE UP (ref 1.6–2.6)
MCHC RBC-ENTMCNC: 32.2 PG — SIGNIFICANT CHANGE UP (ref 27–34)
MCHC RBC-ENTMCNC: 33.7 GM/DL — SIGNIFICANT CHANGE UP (ref 32–36)
MCV RBC AUTO: 95.3 FL — SIGNIFICANT CHANGE UP (ref 80–100)
MONOCYTES # BLD AUTO: 0.47 K/UL — SIGNIFICANT CHANGE UP (ref 0–0.9)
MONOCYTES NFR BLD AUTO: 11.2 % — SIGNIFICANT CHANGE UP (ref 2–14)
NEUTROPHILS # BLD AUTO: 2.71 K/UL — SIGNIFICANT CHANGE UP (ref 1.8–7.4)
NEUTROPHILS NFR BLD AUTO: 64.5 % — SIGNIFICANT CHANGE UP (ref 43–77)
NRBC # BLD: 0 /100 WBCS — SIGNIFICANT CHANGE UP
NRBC # FLD: 0 K/UL — SIGNIFICANT CHANGE UP
NT-PROBNP SERPL-SCNC: 166 PG/ML — SIGNIFICANT CHANGE UP
PHOSPHATE SERPL-MCNC: 2.8 MG/DL — SIGNIFICANT CHANGE UP (ref 2.5–4.5)
PLATELET # BLD AUTO: 118 K/UL — LOW (ref 150–400)
POTASSIUM SERPL-MCNC: 4.4 MMOL/L — SIGNIFICANT CHANGE UP (ref 3.5–5.3)
POTASSIUM SERPL-SCNC: 4.4 MMOL/L — SIGNIFICANT CHANGE UP (ref 3.5–5.3)
PROT SERPL-MCNC: 6.4 G/DL — SIGNIFICANT CHANGE UP (ref 6–8.3)
RBC # BLD: 4.29 M/UL — SIGNIFICANT CHANGE UP (ref 4.2–5.8)
RBC # FLD: 14.2 % — SIGNIFICANT CHANGE UP (ref 10.3–14.5)
SODIUM SERPL-SCNC: 141 MMOL/L — SIGNIFICANT CHANGE UP (ref 135–145)
TROPONIN T, HIGH SENSITIVITY RESULT: 9 NG/L — SIGNIFICANT CHANGE UP
WBC # BLD: 4.2 K/UL — SIGNIFICANT CHANGE UP (ref 3.8–10.5)
WBC # FLD AUTO: 4.2 K/UL — SIGNIFICANT CHANGE UP (ref 3.8–10.5)

## 2022-03-05 RX ORDER — ASPIRIN/CALCIUM CARB/MAGNESIUM 324 MG
162 TABLET ORAL ONCE
Refills: 0 | Status: COMPLETED | OUTPATIENT
Start: 2022-03-05 | End: 2022-03-05

## 2022-03-05 RX ADMIN — Medication 162 MILLIGRAM(S): at 01:15

## 2022-03-05 NOTE — ED ADULT NURSE NOTE - OBJECTIVE STATEMENT
Pt received to room 16. A&Ox4. Ambulatory at baseline. Pmh of BPH, diagnosed w/ HTN as per MD, but denies wanting medication for it. Pt c/o 6 seconds midsternal chest pain nonradiating. Pt endorses recently eating spicy food which may have caused the chest pain. Pt currently endorses he has no chest pain with activity and at rest. Pt vitally stable. Resp even unlabored, breath sounds clear anterior nad posterior, abd soft nontender, pedal pulses 2+ bilaterally. Denies SOB, n/v/d, headache, dizziness, numbness/tingling to hands/feet. 20G to L AC, labs sent. Awaiting further orders.

## 2022-07-26 ENCOUNTER — APPOINTMENT (OUTPATIENT)
Dept: UROLOGY | Facility: CLINIC | Age: 72
End: 2022-07-26

## 2022-07-26 DIAGNOSIS — N50.812 LEFT TESTICULAR PAIN: ICD-10-CM

## 2022-07-26 DIAGNOSIS — N40.1 BENIGN PROSTATIC HYPERPLASIA WITH LOWER URINARY TRACT SYMPMS: ICD-10-CM

## 2022-07-26 DIAGNOSIS — R97.20 ELEVATED PROSTATE, SPECIFIC ANTIGEN [PSA]: ICD-10-CM

## 2022-07-26 PROCEDURE — 99072 ADDL SUPL MATRL&STAF TM PHE: CPT

## 2022-07-26 PROCEDURE — 99213 OFFICE O/P EST LOW 20 MIN: CPT

## 2022-07-26 RX ORDER — FINASTERIDE 5 MG/1
5 TABLET, FILM COATED ORAL
Qty: 30 | Refills: 5 | Status: ACTIVE | COMMUNITY
Start: 2022-07-26 | End: 1900-01-01

## 2022-07-26 NOTE — PHYSICAL EXAM
[General Appearance - Well Developed] : well developed [General Appearance - Well Nourished] : well nourished [Abdomen Soft] : soft [Abdomen Tenderness] : non-tender [Abdomen Mass (___ Cm)] : no abdominal mass palpated [Urethral Meatus] : meatus normal [Penis Abnormality] : normal circumcised penis [Urinary Bladder Findings] : the bladder was normal on palpation [Scrotum] : the scrotum was normal [Epididymis] : the epididymides were normal [Testes Tenderness] : no tenderness of the testes [Testes Mass (___cm)] : there were no testicular masses [Edema] : no peripheral edema [] : no respiratory distress [Exaggerated Use Of Accessory Muscles For Inspiration] : no accessory muscle use [Oriented To Time, Place, And Person] : oriented to person, place, and time [Normal Station and Gait] : the gait and station were normal for the patient's age [No Focal Deficits] : no focal deficits

## 2022-07-26 NOTE — HISTORY OF PRESENT ILLNESS
[FreeTextEntry1] : 70 y/o male with h/o elevated PSA, c/o reddish semen on ejaculation 3 days ago. no hematuria. no use of antithrombotics or anticoagulants. c/o left testicular aches x 3 days. minimal swelling. scrotal ultrasound 1 year ago showed b/l Varicocele. no fever. no dysuria. nocturia 2-4 times but for good amount and strong stream of urine. urgency and occasional urge incontinence. \par PSA: 5.2 i with Free PSA  25.8% in 2015. repeat PSA 1 year ago PSA was 7.3 with free PSA: 19% . patient was offered MRI of prostate in the past but did not do it. he is now concerned about evaluating thr elevated PSA \par \par my repeat 8.5  20% free.\par prostate size today 77 so PSA density 0.11 \par \par 7/22 \par here with left testis pain 3 days with some swelling. Slight tenderness to the touch. \par has some intermittent left testis pain but this worse. No dysuria and urine clear.\par went to Urgent care over the weekend - told he had a "torsion". given Bactrim though UA negative. Feels better. \par LUTs as above other than some increased urge incontinence if drinks large amount. \par has nocturia 3-4 times. No hesitancy staining and FOS Ok.

## 2022-07-27 LAB — PSA SERPL-MCNC: 5.94 NG/ML

## 2022-08-03 ENCOUNTER — APPOINTMENT (OUTPATIENT)
Dept: UROLOGY | Facility: CLINIC | Age: 72
End: 2022-08-03

## 2022-08-07 ENCOUNTER — EMERGENCY (EMERGENCY)
Facility: HOSPITAL | Age: 72
LOS: 1 days | Discharge: HOME | End: 2022-08-07
Attending: EMERGENCY MEDICINE | Admitting: EMERGENCY MEDICINE

## 2022-08-07 VITALS
TEMPERATURE: 99 F | OXYGEN SATURATION: 100 % | DIASTOLIC BLOOD PRESSURE: 87 MMHG | SYSTOLIC BLOOD PRESSURE: 156 MMHG | HEART RATE: 63 BPM | RESPIRATION RATE: 18 BRPM

## 2022-08-07 VITALS
DIASTOLIC BLOOD PRESSURE: 95 MMHG | HEIGHT: 76 IN | OXYGEN SATURATION: 100 % | TEMPERATURE: 98 F | RESPIRATION RATE: 18 BRPM | HEART RATE: 73 BPM | SYSTOLIC BLOOD PRESSURE: 155 MMHG

## 2022-08-07 DIAGNOSIS — Z98.89 OTHER SPECIFIED POSTPROCEDURAL STATES: Chronic | ICD-10-CM

## 2022-08-07 PROCEDURE — 99284 EMERGENCY DEPT VISIT MOD MDM: CPT

## 2022-08-07 NOTE — ED PROVIDER NOTE - OBJECTIVE STATEMENT
73yo M hx of HTN not taking meds because he wanted natural remedies here for HTN. Pt w/o symptoms, measuring his BPs which are normally 160s but last night was 190 then 160 and 140. Wants reassurance that he is not going to have heart attack. No chest pain or sob. Has cardiologist and has seen them for negative CT coronary recently, has follow up appointment with them tomorrow at [facility] for initiation of BP meds. Denies fever, or other symptoms. 73yo M hx of HTN not taking meds because he wanted natural remedies here for HTN. Pt w/o symptoms, measuring his BPs which are normally 160s but last night was 190 then 160 and 140. Wants reassurance that he is not going to have heart attack. No chest pain or sob. Has cardiologist and has seen them for negative CT coronary recently, has follow up appointment with them tomorrow at [facility] for initiation of BP meds. Denies fever, or other symptoms.    Attending/Sae: 71 yo M h/o HTN, as described p/w asymptomatic hypertension.

## 2022-08-07 NOTE — ED PROVIDER NOTE - NSFOLLOWUPINSTRUCTIONS_ED_ALL_ED_FT
You were seen in the Emergency Department for high blood pressure. Please follow up with your cardiologist tomorrow.      1) Continue all previously prescribed medications as directed.    2) Follow up with your primary care physician - take copies of your results.    3) Return to the Emergency Department for worsening or persistent symptoms, and/or ANY NEW OR CONCERNING SYMPTOMS.

## 2022-08-07 NOTE — ED PROVIDER NOTE - PATIENT PORTAL LINK FT
You can access the FollowMyHealth Patient Portal offered by [facility] by registering at the following website: [email or web address]. By joining [employer]’s FollowMyHealth portal, you will also be able to view your health information using other applications (apps) compatible with our system.

## 2022-08-07 NOTE — ED PROVIDER NOTE - CLINICAL SUMMARY MEDICAL DECISION MAKING FREE TEXT BOX
Pt w/ HTN not on meds here asymptomatic. BP 150s systolic, normal exam. Will dc w/ follow up tomorrow w/ his cardiologist.

## 2022-08-07 NOTE — ED PROVIDER NOTE - PHYSICAL EXAMINATION
General: NAD  HEENT: NCAT  Cardiac: RRR, 2+ radial pulses  Chest: CTA  Abdomen: soft, non-distended, no ttp, no rebound or guarding +large ventral hernia reducible  Extremities: no peripheral edema, calf tenderness, or leg size discrepancies  Skin: no rashes  Neuro: AAOx3, motor and sensory grossly intact  Psych: mood and affect appropriate General: NAD  HEENT: NCAT  Cardiac: RRR, 2+ radial pulses  Chest: CTA  Abdomen: soft, non-distended, no ttp, no rebound or guarding +large ventral hernia reducible  Extremities: no peripheral edema, calf tenderness, or leg size discrepancies  Skin: no rashes  Neuro: AAOx3, motor and sensory grossly intact  Psych: mood and affect appropriate    Attending/Sae: Well-appearing, NAD; PERRL/EOMI, non-icterus, supple, no JEANNIE, no JVD, RRR, CTAB; Abd-soft, NT/ND, +ventral hernia, no HSM; no LE edema, A&Ox3, nonfocal; Skin-warm/dry

## 2022-08-07 NOTE — ED ADULT NURSE NOTE - NSIMPLEMENTINTERV_GEN_ALL_ED
Implemented All Universal Safety Interventions:  [place] to call system. Call bell, personal items and telephone within reach. Instruct patient to call for assistance. Room bathroom lighting operational. Non-slip footwear when patient is off stretcher. Physically safe environment: no spills, clutter or unnecessary equipment. Stretcher in lowest position, wheels locked, appropriate side rails in place.

## 2022-08-07 NOTE — ED ADULT NURSE NOTE - OBJECTIVE STATEMENT
Pt. is a 72 y.o male who presents w/ c/o HTN. Pt. A&Ox4 and amb. Pt. states he has HTN but does not take medication. Has apt with a cardiologist tomorrow. Came to ED after taking his BP and noticing it was elevated. Denies HA/N/V/D/vision changes. Resp. equal and unlabored b/l. VSS. NAD. Per MD Ramsey, pt. to be DC.

## 2022-08-07 NOTE — ED ADULT TRIAGE NOTE - CHIEF COMPLAINT QUOTE
Pt arrives ambulatory w/ cane to triage for hypertension. Denies other complaints. Neg headache/CP/vision changes/dizziness. BP reading of 190/104 at home. Not on BP meds yet. PMH: HTN, sleep apnea, panic disorder, BPH.

## 2022-08-15 ENCOUNTER — APPOINTMENT (OUTPATIENT)
Dept: SPEECH THERAPY | Facility: CLINIC | Age: 72
End: 2022-08-15

## 2022-08-22 ENCOUNTER — APPOINTMENT (OUTPATIENT)
Dept: PHARMACY | Facility: CLINIC | Age: 72
End: 2022-08-22

## 2022-08-29 ENCOUNTER — APPOINTMENT (OUTPATIENT)
Dept: PHARMACY | Facility: CLINIC | Age: 72
End: 2022-08-29

## 2022-09-23 ENCOUNTER — APPOINTMENT (OUTPATIENT)
Dept: PHARMACY | Facility: CLINIC | Age: 72
End: 2022-09-23

## 2022-09-26 ENCOUNTER — APPOINTMENT (OUTPATIENT)
Dept: PHARMACY | Facility: CLINIC | Age: 72
End: 2022-09-26

## 2022-09-30 ENCOUNTER — APPOINTMENT (OUTPATIENT)
Dept: PHARMACY | Facility: CLINIC | Age: 72
End: 2022-09-30

## 2022-09-30 PROCEDURE — V5299I: CUSTOM | Mod: RT

## 2022-12-23 ENCOUNTER — NON-APPOINTMENT (OUTPATIENT)
Age: 72
End: 2022-12-23

## 2023-03-09 NOTE — ED ADULT NURSE NOTE - PERIPHERAL VASCULAR WDL
Patient Education & Instructions:   Drink plenty of fluids and stay adequately hydrated.    Take Ibuprofen or acetaminophen (Tylenol) for pain relief.    Avoid sharing drinks & utensils.    Avoid spicy food & other irritating foods.    Cool liquids, popsicles or ice may help with symptoms relief.    Gargle with warm salt water several times a day.    For pain control, use lozenges and sprays. Choose a lozenge brand with a cooling/numbing ingredient like menthol or eucalyptus. Over-the-counter sprays like Chloraseptic have an effect similar to the cooling lozenges.    To decrease inflammation, use the 4 H's. Hydration: drink plenty of fluids. Humidity: use a humidifier at night. Honey: take 2-3 teaspoons a day. Heat: drink a warm cup of tea or broth to sooth a sore throat.    We will send a throat culture that takes 48 hrs to come back. If positive for strep, we will call to prescribe an antibiotic.  Precautions:   Follow up with your PCP if: no improvement or symptoms worsening, fever not better or is worse within 24 hours, signs of joint pain, dehydration, new rash or difficulty swallowing and any abdominal pain, yellowing of skin or severe fatigue.   Go to the ER if: you experience sever difficulty swallowing, drooling, shortness of breath or joint pain.    
Pulses equal bilaterally, no edema present.

## 2023-09-06 ENCOUNTER — APPOINTMENT (OUTPATIENT)
Dept: PHARMACY | Facility: CLINIC | Age: 73
End: 2023-09-06
Payer: SELF-PAY

## 2023-09-06 PROCEDURE — V5299A: CUSTOM

## 2023-09-18 ENCOUNTER — OUTPATIENT (OUTPATIENT)
Dept: OUTPATIENT SERVICES | Facility: HOSPITAL | Age: 73
LOS: 1 days | Discharge: HOME | End: 2023-09-18

## 2023-09-18 ENCOUNTER — APPOINTMENT (OUTPATIENT)
Dept: PHARMACY | Facility: CLINIC | Age: 73
End: 2023-09-18
Payer: SELF-PAY

## 2023-09-18 ENCOUNTER — APPOINTMENT (OUTPATIENT)
Dept: SPEECH THERAPY | Facility: CLINIC | Age: 73
End: 2023-09-18

## 2023-09-18 DIAGNOSIS — Z98.89 OTHER SPECIFIED POSTPROCEDURAL STATES: Chronic | ICD-10-CM

## 2023-09-18 PROCEDURE — V5299A: CUSTOM

## 2023-09-26 DIAGNOSIS — H90.3 SENSORINEURAL HEARING LOSS, BILATERAL: ICD-10-CM

## 2023-10-30 ENCOUNTER — APPOINTMENT (OUTPATIENT)
Dept: PHARMACY | Facility: CLINIC | Age: 73
End: 2023-10-30

## 2023-12-18 ENCOUNTER — APPOINTMENT (OUTPATIENT)
Dept: PHARMACY | Facility: CLINIC | Age: 73
End: 2023-12-18
Payer: SELF-PAY

## 2023-12-18 PROCEDURE — V5010 ASSESSMENT FOR HEARING AID: CPT | Mod: NC

## 2023-12-19 NOTE — ADDENDUM
[FreeTextEntry1] : Patient seen just prior to office closing on 12/18/23. He contacted his insurance and he would like to leave deposit, pending that he can have hearing aid insurance coverage. As per BP, if patient cannot be seen out of network and insurance be billed by the office, deposit can be returned, only as a one time courtesy. Discussed with patient. Patient indicated that he understood all.   12/19/23-Pre-authorization required by insurance.

## 2023-12-19 NOTE — REASON FOR VISIT
[Consultation] : consultation for [Hearing Aid] : Hearing Aid [FreeTextEntry1] : 73-year-old male currently wearing Widex Moment 220-312D for the right and Widex Unique 220 Fusion for the left. RSN: [id number] LSN:[id number] Receivers: 2S for the right and S-2 left Domes: Right medium double vented and left large open Left hearing aid: OOW Right hearing aid: UW 9/30/2023.

## 2024-02-06 ENCOUNTER — APPOINTMENT (OUTPATIENT)
Dept: OTOLARYNGOLOGY | Facility: CLINIC | Age: 74
End: 2024-02-06

## 2024-07-29 ENCOUNTER — APPOINTMENT (OUTPATIENT)
Dept: PHARMACY | Facility: CLINIC | Age: 74
End: 2024-07-29
Payer: SELF-PAY

## 2024-07-29 PROCEDURE — V5010 ASSESSMENT FOR HEARING AID: CPT | Mod: NC

## 2024-07-29 NOTE — HISTORY OF PRESENT ILLNESS
[FreeTextEntry1] : KEEGAN VARMA is being seen for consultation for Hearing Aid.    73-year-old male currently wearing Widex Moment 220-312D for the right and Widex Unique 220 Fusion for the left. Patient has been seen for a hearing aid evaluation already in December 2023, however, he has been dealing with his insurance company regarding hearing aid coverage for the past few months. He is seeing his ENT Dr. August 5th and will bring his hearing test and medical clearance for hearing aids to the next visit.

## 2024-08-27 NOTE — ED PROVIDER NOTE - FAMILY HISTORY
injection.     IR ARTHR/ASP/INJ MAJOR JT/BURSA RIGHT WO US    Result Date: 8/14/2024  EXAMINATION: FLUOROSCOPIC GUIDED INJECTION OF THE right hip 8/14/2024 10:09 am HISTORY: ORDERING SYSTEM PROVIDED HISTORY: Right hip pain TECHNOLOGIST PROVIDED HISTORY: Reason for Exam: Chronic rt hip pain; 2 ml omni 240 used for confirmation 8 ml wasted; 3 ml marcaine 0.5% and 40 mg kenalog used; 0.3 min fluoro time, 17.38 mGy air kerma FLUOROSCOPY DOSE AND TYPE: Radiation Exposure Index: Kerma mGy, PROCEDURE: :  Sunil Garduno DO Informed consent was obtained and universal protocol was observed. Sterile gowns, masks, hats and gloves utilized for maximal sterile barrier. Under standard sterile condition, local anesthesia with subcutaneous 2% lidocaine was administered.  A skin entry site was selected with fluoroscopy. A 22 gauge spinal needle was inserted into the right hip joint under fluoroscopic guidance.  Approximately 1 ml of iodinated contrast was injected into the joint to confirm location. Subsequently, 4 ml of a mixture of 40 mg of Kenalog and 3 ml of ropivacaine was injected into the joint.  The needle was removed and a sterile bandage was placed.     Successful fluoroscopic-guided right hip injection.     XR HIP RIGHT (2-3 VIEWS)    Result Date: 8/12/2024  EXAMINATION: TWO XRAY VIEWS OF THE RIGHT HIP 8/8/2024 8:14 am COMPARISON: 4 May 2021 HISTORY: ORDERING SYSTEM PROVIDED HISTORY: Right hip pain TECHNOLOGIST PROVIDED HISTORY: right hip pain Reason for Exam: No injury; chronic rt hip pain FINDINGS: Mineralization is satisfactory.  Both femoral heads are well circumscribed and situated within the acetabuli.  Minimal to mild hip degenerative changes are noted, relatively symmetrical in unchanged from prior study.  SI joints are symmetrical.     Minimal to mild hip degenerative changes, relatively symmetrical and unchanged from prior study.       X-rays personally reviewed by me of x-rays of the right and left 
Grandparent  Still living? Unknown  Family history of diabetes mellitus, Age at diagnosis: Age Unknown  Family history of cancer, Age at diagnosis: Age Unknown

## 2024-09-23 ENCOUNTER — APPOINTMENT (OUTPATIENT)
Dept: PHARMACY | Facility: CLINIC | Age: 74
End: 2024-09-23

## 2024-09-24 ENCOUNTER — APPOINTMENT (OUTPATIENT)
Dept: PHARMACY | Facility: CLINIC | Age: 74
End: 2024-09-24

## 2024-12-03 ENCOUNTER — NON-APPOINTMENT (OUTPATIENT)
Age: 74
End: 2024-12-03

## 2025-01-27 ENCOUNTER — APPOINTMENT (OUTPATIENT)
Dept: PHARMACY | Facility: CLINIC | Age: 75
End: 2025-01-27

## 2025-03-05 NOTE — ED PROVIDER NOTE - NS PRO PASSIVE SMOKE EXP
Health Maintenance       Hepatitis B Vaccine (1 of 3 - 19+ 3-dose series)  Never done    Shingles Vaccine (1 of 2)  Ordered on 3/5/2025    Influenza Vaccine (1)  Overdue since 9/1/2024    COVID-19 Vaccine (4 - 2024-25 season)  Overdue since 9/1/2024           Following review of the above:  Patient is not proceeding with: COVID-19 and Influenza    Note: Refer to final orders and clinician documentation.          Recent PHQ 2/9 Score    PHQ 2:  PHQ 2 Score Adult PHQ 2 Score Adult PHQ 2 Interpretation Little interest or pleasure in activity?   3/5/2025  10:40 AM 0 No further screening needed 0       PHQ 9:     PHQ-2/9 Depression Screening  Little interest or pleasure in activity?: Not at all  Feeling down, depressed or hopeless?: Not at all  Initial depression screening score:: 0  PHQ2 Interpretation: No further screening needed      No

## 2025-07-18 ENCOUNTER — APPOINTMENT (OUTPATIENT)
Dept: UROLOGY | Facility: CLINIC | Age: 75
End: 2025-07-18

## 2025-08-25 ENCOUNTER — APPOINTMENT (OUTPATIENT)
Dept: PHARMACY | Facility: CLINIC | Age: 75
End: 2025-08-25